# Patient Record
Sex: FEMALE | Race: WHITE | NOT HISPANIC OR LATINO | Employment: UNEMPLOYED | URBAN - METROPOLITAN AREA
[De-identification: names, ages, dates, MRNs, and addresses within clinical notes are randomized per-mention and may not be internally consistent; named-entity substitution may affect disease eponyms.]

---

## 2017-07-24 ENCOUNTER — HOSPITAL ENCOUNTER (OUTPATIENT)
Dept: FAMILY MEDICINE CLINIC | Facility: CLINIC | Age: 47
Setting detail: SPECIMEN
Discharge: HOME | End: 2017-07-24
Attending: FAMILY MEDICINE | Admitting: FAMILY MEDICINE

## 2017-07-24 LAB
BASOPHILS # BLD AUTO: 0.1 10*3/UL (ref 0–0.2)
BASOPHILS NFR BLD AUTO: 1 % (ref 0–2)
DIFFERENTIAL METHOD BLD: (no result)
EOSINOPHIL # BLD AUTO: 0 10*3/UL (ref 0–0.3)
EOSINOPHIL # BLD AUTO: 1 % (ref 0–3)
ERYTHROCYTE [DISTWIDTH] IN BLOOD BY AUTOMATED COUNT: 15.7 % (ref 11.5–14.5)
FERRITIN SERPL-MCNC: 23 NG/ML (ref 11–307)
FOLATE SERPL-MCNC: 15 NG/ML (ref 5.9–24.8)
HCT VFR BLD AUTO: 45 % (ref 35–49)
HGB BLD-MCNC: 15.3 G/DL (ref 12–15)
LYMPHOCYTES # BLD AUTO: 1 10*3/UL (ref 0.8–4.8)
LYMPHOCYTES NFR BLD AUTO: 18 % (ref 18–42)
MAGNESIUM SERPL-MCNC: 2.5 MG/DL (ref 1.8–2.5)
MCH RBC QN AUTO: 30.3 PG (ref 26–32)
MCHC RBC AUTO-ENTMCNC: 33.9 G/DL (ref 32–36)
MCV RBC AUTO: 89.4 FL (ref 80–94)
MONOCYTES # BLD AUTO: 0.4 10*3/UL (ref 0.1–1.3)
MONOCYTES NFR BLD AUTO: 8 % (ref 2–11)
NEUTROPHILS # BLD AUTO: 4 10*3/UL (ref 2.3–8.6)
NEUTROPHILS NFR BLD AUTO: 72 % (ref 50–75)
NRBC BLD AUTO-RTO: 0 /100{WBCS}
NRBC/RBC NFR BLD MANUAL: 0 10*3/UL
PLATELET # BLD AUTO: 271 10*3/UL (ref 150–450)
PMV BLD AUTO: 8.4 FL (ref 7.4–10.4)
RBC # BLD AUTO: 5.04 10*6/UL (ref 4–5.4)
T3FREE SERPL-MCNC: 3.62 PG/ML (ref 2.39–6.79)
T4 FREE SERPL-MCNC: 0.6 NG/DL (ref 0.58–1.64)
TSH SERPL-ACNC: 1.58 UIU/ML (ref 0.34–5.6)
VIT B12 SERPL-MCNC: 263 PG/ML (ref 180–914)
WBC # BLD AUTO: 5.5 10*3/UL (ref 4.5–11.5)

## 2017-07-25 ENCOUNTER — HOSPITAL ENCOUNTER (OUTPATIENT)
Dept: LAB | Facility: HOSPITAL | Age: 47
Setting detail: SPECIMEN
Discharge: HOME | End: 2017-07-25
Attending: INTERNAL MEDICINE | Admitting: INTERNAL MEDICINE

## 2017-07-25 LAB
ALBUMIN SERPL-MCNC: 4.3 G/DL (ref 3.5–4.8)
ALBUMIN/GLOB SERPL: 1.4 {RATIO} (ref 1–1.7)
ALP SERPL-CCNC: 55 IU/L (ref 32–91)
ALT SERPL-CCNC: 20 IU/L (ref 14–54)
ANION GAP SERPL CALC-SCNC: 13.6 MMOL/L (ref 10–20)
AST SERPL-CCNC: 24 IU/L (ref 15–41)
BILIRUB SERPL-MCNC: 0.5 MG/DL (ref 0.3–1.2)
BUN SERPL-MCNC: 13 MG/DL (ref 8–20)
BUN/CREAT SERPL: 13 (ref 5.4–26.2)
CALCIUM SERPL-MCNC: 9.4 MG/DL (ref 8.9–10.3)
CHLORIDE SERPL-SCNC: 104 MMOL/L (ref 101–111)
CONV CO2: 27 MMOL/L (ref 22–32)
CONV TOTAL PROTEIN: 7.4 G/DL (ref 6.1–7.9)
CORTIS SERPL-MCNC: NORMAL UG/DL
CREAT UR-MCNC: 1 MG/DL (ref 0.4–1)
GLOBULIN UR ELPH-MCNC: 3.1 G/DL (ref 2.5–3.8)
GLUCOSE SERPL-MCNC: 89 MG/DL (ref 65–99)
POTASSIUM SERPL-SCNC: 4.6 MMOL/L (ref 3.6–5.1)
SODIUM SERPL-SCNC: 140 MMOL/L (ref 136–144)
TSH SERPL-ACNC: 1.81 UIU/ML (ref 0.34–5.6)

## 2017-07-26 LAB
FSH SERPL-ACNC: NORMAL MIU/ML
LH SERPL-ACNC: NORMAL MIU/ML
PROGEST SERPL-MCNC: NORMAL NG/ML

## 2018-10-09 ENCOUNTER — HOSPITAL ENCOUNTER (OUTPATIENT)
Dept: WOUND CARE | Facility: HOSPITAL | Age: 48
Discharge: HOME | End: 2018-10-09
Attending: NURSE PRACTITIONER | Admitting: NURSE PRACTITIONER

## 2019-01-09 ENCOUNTER — HOSPITAL ENCOUNTER (OUTPATIENT)
Dept: OTHER | Facility: HOSPITAL | Age: 49
Setting detail: RECURRING SERIES
Discharge: HOME | End: 2019-05-01
Attending: OBSTETRICS & GYNECOLOGY | Admitting: OBSTETRICS & GYNECOLOGY

## 2019-03-05 ENCOUNTER — HOSPITAL ENCOUNTER (OUTPATIENT)
Facility: HOSPITAL | Age: 49
Setting detail: HOSPITAL OUTPATIENT SURGERY
End: 2019-03-05
Attending: ORTHOPAEDIC SURGERY | Admitting: ORTHOPAEDIC SURGERY

## 2019-03-06 ENCOUNTER — HOSPITAL ENCOUNTER (OUTPATIENT)
Dept: PREADMISSION TESTING | Facility: HOSPITAL | Age: 49
Discharge: HOME | End: 2019-03-06
Attending: ORTHOPAEDIC SURGERY | Admitting: ORTHOPAEDIC SURGERY

## 2019-03-06 LAB
ALBUMIN SERPL-MCNC: 4.4 G/DL (ref 3.5–4.8)
ALBUMIN/GLOB SERPL: 1.3 {RATIO} (ref 1–1.7)
ALP SERPL-CCNC: 53 IU/L (ref 32–91)
ALT SERPL-CCNC: 18 IU/L (ref 14–54)
ANION GAP SERPL CALC-SCNC: 17.9 MMOL/L (ref 10–20)
AST SERPL-CCNC: 28 IU/L (ref 15–41)
BASOPHILS # BLD AUTO: 0 10*3/UL (ref 0–0.2)
BASOPHILS NFR BLD AUTO: 1 % (ref 0–2)
BILIRUB SERPL-MCNC: 0.7 MG/DL (ref 0.3–1.2)
BUN SERPL-MCNC: 12 MG/DL (ref 8–20)
BUN/CREAT SERPL: 12 (ref 5.4–26.2)
CALCIUM SERPL-MCNC: 9.5 MG/DL (ref 8.9–10.3)
CHLORIDE SERPL-SCNC: 100 MMOL/L (ref 101–111)
CONV CO2: 24 MMOL/L (ref 22–32)
CONV TOTAL PROTEIN: 7.8 G/DL (ref 6.1–7.9)
CREAT UR-MCNC: 1 MG/DL (ref 0.4–1)
DIFFERENTIAL METHOD BLD: (no result)
EOSINOPHIL # BLD AUTO: 0 % (ref 0–3)
EOSINOPHIL # BLD AUTO: 0 10*3/UL (ref 0–0.3)
ERYTHROCYTE [DISTWIDTH] IN BLOOD BY AUTOMATED COUNT: 15.4 % (ref 11.5–14.5)
GLOBULIN UR ELPH-MCNC: 3.4 G/DL (ref 2.5–3.8)
GLUCOSE SERPL-MCNC: 96 MG/DL (ref 65–99)
HCT VFR BLD AUTO: 44.3 % (ref 35–49)
HGB BLD-MCNC: 14.9 G/DL (ref 12–15)
LYMPHOCYTES # BLD AUTO: 1.2 10*3/UL (ref 0.8–4.8)
LYMPHOCYTES NFR BLD AUTO: 17 % (ref 18–42)
MCH RBC QN AUTO: 30 PG (ref 26–32)
MCHC RBC AUTO-ENTMCNC: 33.7 G/DL (ref 32–36)
MCV RBC AUTO: 89 FL (ref 80–94)
MONOCYTES # BLD AUTO: 0.5 10*3/UL (ref 0.1–1.3)
MONOCYTES NFR BLD AUTO: 7 % (ref 2–11)
NEUTROPHILS # BLD AUTO: 5.6 10*3/UL (ref 2.3–8.6)
NEUTROPHILS NFR BLD AUTO: 75 % (ref 50–75)
NRBC BLD AUTO-RTO: 0 /100{WBCS}
NRBC/RBC NFR BLD MANUAL: 0 10*3/UL
PLATELET # BLD AUTO: 243 10*3/UL (ref 150–450)
PMV BLD AUTO: 7.8 FL (ref 7.4–10.4)
POTASSIUM SERPL-SCNC: 3.9 MMOL/L (ref 3.6–5.1)
RBC # BLD AUTO: 4.98 10*6/UL (ref 4–5.4)
SODIUM SERPL-SCNC: 138 MMOL/L (ref 136–144)
WBC # BLD AUTO: 7.4 10*3/UL (ref 4.5–11.5)

## 2019-05-02 ENCOUNTER — HOSPITAL ENCOUNTER (OUTPATIENT)
Dept: OTHER | Facility: HOSPITAL | Age: 49
Setting detail: RECURRING SERIES
Discharge: HOME | End: 2019-06-03
Attending: OBSTETRICS & GYNECOLOGY | Admitting: OBSTETRICS & GYNECOLOGY

## 2019-06-17 ENCOUNTER — TELEPHONE (OUTPATIENT)
Dept: FAMILY MEDICINE CLINIC | Facility: CLINIC | Age: 49
End: 2019-06-17

## 2019-06-18 RX ORDER — LEVOTHYROXINE AND LIOTHYRONINE 57; 13.5 UG/1; UG/1
TABLET ORAL
COMMUNITY
Start: 2017-07-24 | End: 2019-06-18

## 2019-06-18 RX ORDER — LEVOTHYROXINE, LIOTHYRONINE 57; 13.5 UG/1; UG/1
90 TABLET ORAL DAILY
Refills: 2 | COMMUNITY
Start: 2019-05-15 | End: 2020-10-15 | Stop reason: SDUPTHER

## 2019-06-18 RX ORDER — LEVOTHYROXINE, LIOTHYRONINE 9.5; 2.25 UG/1; UG/1
TABLET ORAL
Refills: 4 | COMMUNITY
Start: 2019-05-25 | End: 2020-08-25

## 2019-06-18 RX ORDER — MELATONIN 3 MG
20 LOZENGE ORAL NIGHTLY
COMMUNITY
Start: 2014-03-14

## 2019-06-18 RX ORDER — ACYCLOVIR 400 MG/1
400 TABLET ORAL
Qty: 40 TABLET | Refills: 2 | Status: SHIPPED | OUTPATIENT
Start: 2019-06-18 | End: 2019-06-22 | Stop reason: ALTCHOICE

## 2019-06-20 ENCOUNTER — TELEPHONE (OUTPATIENT)
Dept: FAMILY MEDICINE CLINIC | Facility: CLINIC | Age: 49
End: 2019-06-20

## 2019-06-20 RX ORDER — ACYCLOVIR 50 MG/G
OINTMENT TOPICAL
Qty: 5 G | Refills: 2 | Status: SHIPPED | OUTPATIENT
Start: 2019-06-20 | End: 2019-06-27

## 2019-06-21 ENCOUNTER — TELEPHONE (OUTPATIENT)
Dept: FAMILY MEDICINE CLINIC | Facility: CLINIC | Age: 49
End: 2019-06-21

## 2019-06-22 DIAGNOSIS — K21.9 GASTROESOPHAGEAL REFLUX DISEASE WITHOUT ESOPHAGITIS: Primary | ICD-10-CM

## 2019-06-22 RX ORDER — RANITIDINE 150 MG/1
150 TABLET ORAL 2 TIMES DAILY
Status: DISCONTINUED | OUTPATIENT
Start: 2019-06-22 | End: 2020-09-28

## 2019-07-16 ENCOUNTER — TELEPHONE (OUTPATIENT)
Dept: FAMILY MEDICINE CLINIC | Facility: CLINIC | Age: 49
End: 2019-07-16

## 2019-07-16 NOTE — TELEPHONE ENCOUNTER
Pharmacy request omeprazole 20mg capsule, qty 30, sig: take 1 cap po daily before breakfast, swallow whole do not crush or chew.

## 2019-07-17 RX ORDER — OMEPRAZOLE 20 MG/1
20 CAPSULE, DELAYED RELEASE ORAL DAILY
Qty: 90 CAPSULE | Refills: 1 | Status: SHIPPED | OUTPATIENT
Start: 2019-07-17 | End: 2019-07-22

## 2019-07-22 ENCOUNTER — OFFICE VISIT (OUTPATIENT)
Dept: FAMILY MEDICINE CLINIC | Facility: CLINIC | Age: 49
End: 2019-07-22

## 2019-07-22 VITALS
BODY MASS INDEX: 22.89 KG/M2 | SYSTOLIC BLOOD PRESSURE: 128 MMHG | HEART RATE: 100 BPM | WEIGHT: 124.4 LBS | HEIGHT: 62 IN | OXYGEN SATURATION: 100 % | DIASTOLIC BLOOD PRESSURE: 78 MMHG

## 2019-07-22 DIAGNOSIS — E03.9 ACQUIRED HYPOTHYROIDISM: ICD-10-CM

## 2019-07-22 DIAGNOSIS — R09.89 LABILE HYPERTENSION: Primary | ICD-10-CM

## 2019-07-22 DIAGNOSIS — E34.9 HORMONE IMBALANCE: ICD-10-CM

## 2019-07-22 DIAGNOSIS — R00.2 PALPITATIONS: ICD-10-CM

## 2019-07-22 PROBLEM — R10.2 PELVIC PAIN: Status: ACTIVE | Noted: 2019-02-21

## 2019-07-22 PROBLEM — R79.89 LOW TESTOSTERONE: Status: ACTIVE | Noted: 2017-03-23

## 2019-07-22 PROBLEM — L65.9 HAIR LOSS: Status: ACTIVE | Noted: 2017-07-24

## 2019-07-22 PROBLEM — R07.9 CHEST PAIN: Status: ACTIVE | Noted: 2019-05-14

## 2019-07-22 PROBLEM — N92.4 PREMENOPAUSAL MENORRHAGIA: Status: ACTIVE | Noted: 2017-03-23

## 2019-07-22 PROBLEM — E55.9 VITAMIN D DEFICIENCY: Status: ACTIVE | Noted: 2017-07-24

## 2019-07-22 PROBLEM — E61.2 MAGNESIUM DEFICIENCY: Status: ACTIVE | Noted: 2017-07-24

## 2019-07-22 PROCEDURE — 99213 OFFICE O/P EST LOW 20 MIN: CPT | Performed by: FAMILY MEDICINE

## 2019-07-22 RX ORDER — DEXMETHYLPHENIDATE HYDROCHLORIDE 10 MG/1
TABLET ORAL
Refills: 0 | COMMUNITY
Start: 2019-06-17 | End: 2019-08-28 | Stop reason: SDUPTHER

## 2019-07-22 RX ORDER — TRAMADOL HYDROCHLORIDE 50 MG/1
25 TABLET ORAL 2 TIMES DAILY PRN
COMMUNITY
End: 2020-01-22 | Stop reason: SDUPTHER

## 2019-07-22 NOTE — PROGRESS NOTES
"Subjective   Nessa LORY Esqueda is a 48 y.o. female.     Pt in for F/U Labile HBP and CP w recent NSCP w Neg Stress Myoview by Dr Castro.  Sees Dr Martínez normally.     /78   Pulse 100   Ht 157.5 cm (62.01\")   Wt 56.4 kg (124 lb 6.4 oz)   SpO2 100%   BMI 22.75 kg/m²     The following portions of the patient's history were reviewed and updated as appropriate: allergies, current medications, past family history, past medical history and problem list.    Review of Systems   Constitutional: Positive for fatigue.   HENT: Negative.    Eyes: Negative.    Respiratory: Negative.    Cardiovascular: Positive for palpitations.   Gastrointestinal: Negative.    Endocrine:        Hx of Hypothyroidism and Estrogen/Testosterone deficiency w supplementation.   Genitourinary: Negative.    Neurological: Negative.    Psychiatric/Behavioral: Positive for dysphoric mood. The patient is nervous/anxious.        Objective   Physical Exam   Constitutional: She is oriented to person, place, and time. She appears well-developed and well-nourished. No distress.   HENT:   Head: Normocephalic and atraumatic.   Nose: Nose normal.   Mouth/Throat: Oropharynx is clear and moist.   Eyes: Conjunctivae and EOM are normal. Pupils are equal, round, and reactive to light.   Neck: Normal range of motion. Neck supple. No thyromegaly present.   Cardiovascular: Normal rate, regular rhythm, normal heart sounds and intact distal pulses. Exam reveals no gallop.   No murmur heard.  Pulmonary/Chest: Effort normal and breath sounds normal. She has no wheezes. She has no rales.   Abdominal: Soft. Bowel sounds are normal. She exhibits no mass. There is no tenderness.   Lymphadenopathy:     She has no cervical adenopathy.   Neurological: She is alert and oriented to person, place, and time. No cranial nerve deficit or sensory deficit. She exhibits normal muscle tone.   Skin: Skin is warm and dry. Capillary refill takes 2 to 3 seconds. No rash noted. "   Psychiatric: She has a normal mood and affect. Her behavior is normal. Judgment and thought content normal.       Assessment/Plan   Nessa was seen today for heart problem.    Diagnoses and all orders for this visit:    Labile hypertension    Palpitations    Hormone imbalance    Acquired hypothyroidism

## 2019-07-22 NOTE — PATIENT INSTRUCTIONS
Recent CV W/U reviewed.  Labs and Meds reviewed w no changes at this time.  Cont Healthy Heart Diet and Exercise. F/U prn or in 6 mo for Annual Preventive Exam/Labs.

## 2019-08-28 ENCOUNTER — OFFICE VISIT (OUTPATIENT)
Dept: PSYCHIATRY | Facility: CLINIC | Age: 49
End: 2019-08-28

## 2019-08-28 VITALS — SYSTOLIC BLOOD PRESSURE: 140 MMHG | DIASTOLIC BLOOD PRESSURE: 80 MMHG

## 2019-08-28 DIAGNOSIS — F90.0 ATTENTION DEFICIT HYPERACTIVITY DISORDER (ADHD), PREDOMINANTLY INATTENTIVE TYPE: Primary | ICD-10-CM

## 2019-08-28 PROCEDURE — 99213 OFFICE O/P EST LOW 20 MIN: CPT | Performed by: PHYSICIAN ASSISTANT

## 2019-08-28 RX ORDER — DEXMETHYLPHENIDATE HYDROCHLORIDE 10 MG/1
TABLET ORAL
Qty: 90 TABLET | Refills: 0 | Status: SHIPPED | OUTPATIENT
Start: 2019-08-28 | End: 2019-10-04 | Stop reason: SDUPTHER

## 2019-08-28 NOTE — PROGRESS NOTES
"Subjective   Nessaevan Esqueda is a 48 y.o.white female who presents today for follow up    Chief Complaint:  Depression, insomnia, ADHD    History of Present Illness:   She is trying to get more help with her autistic son, therapist was   Behaviors have increased with him  Her daughter, also autistic, got kicked off the color guard team, she is fighting the administration at  for violating her IP  Still not sleeping well but does not tolerate meds or not effective  Still having marital issues  Switching to focalin was helpful    The following portions of the patient's history were reviewed and updated as appropriate: allergies, current medications, past family history, past medical history, past social history, past surgical history and problem list.    PAST PSYCHIATRIC HISTORY  Axis I  Affective/Bipoloar Disorder, Anxiety/Panic Disorder, Attention Deficit Disorder  Axis II  None    PAST OUTPATIENT TREATMENT  Diagnosis treated:  Affective Disorder, Anxiety/Panic Disorder, ADD  Treatment Type:  Medication Management, Supportive Counseling  Prior Psychiatric Medications:  Halcion did not help a lot and makes her restless in bed.  Doxepin did not help  Trazodone made her hair fall out.   Lunesta quit working.  Quetiapine   elavil did not help  Ambien was too \"hypnotic\"  Lunesta   Support Groups:  None  Sequelae Of Mental Disorder:  social isolation, family disruption, emotional distress      Interval History  No Change    Side Effects  None      Past Medical History:  Past Medical History:   Diagnosis Date   • ADHD (attention deficit hyperactivity disorder)    • Disease of thyroid gland    • Psychiatric illness    • PTSD (post-traumatic stress disorder)        Social History:  Social History     Socioeconomic History   • Marital status:      Spouse name: Not on file   • Number of children: Not on file   • Years of education: Not on file   • Highest education level: Not on file   Tobacco Use   • Smoking " status: Never Smoker   Substance and Sexual Activity   • Alcohol use: Yes     Comment: socially   • Drug use: No       Family History:  History reviewed. No pertinent family history.    Past Surgical History:  Past Surgical History:   Procedure Laterality Date   • ABDOMINAL SURGERY     • BREAST SURGERY     • HYSTERECTOMY         Problem List:  Patient Active Problem List   Diagnosis   • ADHD   • Encounter for routine gynecological examination   • Fatigue   • Hair loss   • Hormone imbalance   • Hypothyroidism   • Insomnia   • Low testosterone   • Magnesium deficiency   • Menstrual spotting   • Palpitations   • Chest pain   • Pelvic pain   • Premenopausal menorrhagia   • Vitamin D deficiency       Allergy:   Allergies   Allergen Reactions   • Lortab  [Hydrocodone-Acetaminophen] Unknown (See Comments)        Discontinued Medications:  Medications Discontinued During This Encounter   Medication Reason   • dexmethylphenidate (FOCALIN) 10 MG tablet Reorder       Current Medications:   Current Outpatient Medications   Medication Sig Dispense Refill   • dexmethylphenidate (FOCALIN) 10 MG tablet Take two in the am and one in the afternoon 90 tablet 0   • B Complex Vitamins (VITAMIN B COMPLEX PO) Take  by mouth.     • Cholecalciferol (VITAMIN D PO) Take  by mouth.     • DHEA 10 MG capsule Take  by mouth.     • MAGNESIUM PO MAGNESIUM CAPS     • Melatonin-Pyridoxine 1-10 MG tablet MELATIN TABS     • NP THYROID 15 MG tablet TAKE ONE TABLET BY MOUTH THREE TIMES A WEEK  4   • NP THYROID 90 MG tablet Take 90 mg by mouth Daily.  2   • progesterone (PROMETRIUM) 100 MG capsule Take 300 mg by mouth Daily.  6   • Specialty Vitamins Products (BIOTIN PLUS KERATIN PO) Take  by mouth.     • Testosterone Propionate (FIRST-TESTOSTERONE MC) 2 % cream Place  on the skin as directed by provider.     • traMADol (ULTRAM) 50 MG tablet Take 25 mg by mouth 2 (Two) Times a Day As Needed for Moderate Pain .     • TRAMADOL HCL PO Take 25 mg by mouth 2  (Two) Times a Day As Needed.       Current Facility-Administered Medications   Medication Dose Route Frequency Provider Last Rate Last Dose   • raNITIdine (ZANTAC) tablet 150 mg  150 mg Oral BID Milton Allen Jr., MD             Review of Symptoms:    Psychiatric/Behavioral: Negative for agitation, behavioral problems, confusion, decreased concentration, dysphoric mood, hallucinations, self-injury, sleep disturbance and suicidal ideas. The patient is not nervous/anxious and is not hyperactive.        Physical Exam:   Blood pressure 140/80.    Mental Status Exam:   Hygiene:   good  Cooperation:  Cooperative  Eye Contact:  Good  Psychomotor Behavior:  Appropriate  Affect:  Appropriate  Mood: anxious  Hopelessness: Denies  Speech:  Normal  Thought Process:  Goal directed  Thought Content:  Normal  Suicidal:  None  Homicidal:  None  Hallucinations:  None  Delusion:  None  Memory:  Intact  Orientation:  Person, Place, Time and Situation  Reliability:  good  Insight:  Good  Judgement:  Good  Impulse Control:  Good  Physical/Medical Issues:  No        PHQ-9 Depression Screening  Little interest or pleasure in doing things? 1   Feeling down, depressed, or hopeless? 2   Trouble falling or staying asleep, or sleeping too much? 2   Feeling tired or having little energy? 2   Poor appetite or overeating? 0   Feeling bad about yourself - or that you are a failure or have let yourself or your family down? 1   Trouble concentrating on things, such as reading the newspaper or watching television? 1   Moving or speaking so slowly that other people could have noticed? Or the opposite - being so fidgety or restless that you have been moving around a lot more than usual? 0   Thoughts that you would be better off dead, or of hurting yourself in some way? 0   PHQ-9 Total Score 9   If you checked off any problems, how difficult have these problems made it for you to do your work, take care of things at home, or get along with other  people? Somewhat difficult           Never smoker    I advised Nessa of the risks of tobacco use.     Lab Results:   No visits with results within 3 Month(s) from this visit.   Latest known visit with results is:   Admission on 05/14/2019, Discharged on 05/15/2019   Component Date Value Ref Range Status   • Troponin I 05/14/2019 <0.03  0.00 - 0.03 ng/mL Final    Comment: (SEE BELOW)  0.00 - 0.03    Negative. Repeat testing in 4-6 hrs if                clinically indicated.    0.04 - 0.29    Suspicious for myocardial injury. Serial                 measurements and clinical correlation may be                 necessary to confirm or exclude diagnosis of                acute coronary syndrome.                Repeat testing in 4-6 hrs if indicated.         >0.29    Consistent with myocardial injury.                Recommend clinical and laboratory correlation.    NOTE: Results may be falsely decreased if patient taking Biotin     • WBC 05/14/2019 5.1  4.5 - 11.5 10*3/uL Final   • RBC 05/14/2019 5.33  4.00 - 5.40 10*6/uL Final   • Hemoglobin 05/14/2019 16.2* 12.0 - 15.0 g/dL Final   • Hematocrit 05/14/2019 48.4  35 - 49 % Final   • MCV 05/14/2019 90.8  80 - 94 fL Final   • MCH 05/14/2019 30.4  26 - 32 pg Final   • MCHC 05/14/2019 33.4  32 - 36 g/dL Final   • RDW 05/14/2019 14.0  11.5 - 14.5 % Final   • Platelets 05/14/2019 222  150 - 450 10*3/uL Final   • MPV 05/14/2019 8.3  7.4 - 10.4 fL Final   • Differential Type 05/14/2019 AUTO   Final   • Neutrophils Absolute 05/14/2019 3.8  2.3 - 8.6 10*3/uL Final   • Lymphocytes Absolute 05/14/2019 0.6* 0.8 - 4.8 10*3/uL Final   • Monocytes Absolute 05/14/2019 0.6  0.1 - 1.3 10*3/uL Final   • Eosinophils Absolute 05/14/2019 0.1  0.0 - 0.3 10*3/uL Final   • Basophils Absolute 05/14/2019 0.0  0 - 0.2 10*3/uL Final   • Neutrophil Rel % 05/14/2019 75  50 - 75 % Final   • Lymphocyte Rel % 05/14/2019 12* 18 - 42 % Final   • Monocyte Rel % 05/14/2019 11  2 - 11 % Final   • Eosinophil  Rel % 05/14/2019 1  0 - 3 % Final   • Basophil Rel % 05/14/2019 1  0 - 2 % Final   • nRBC 05/14/2019 0  0 /100[WBCs] Final   • Absolute nRBC 05/14/2019 0  10*3/uL Final   • Protime 05/14/2019 10.4  9.6 - 11.7 sec Final   • INR 05/14/2019 1.0   Final   • PTT 05/14/2019 24.7  24.0 - 31.0 sec Final   • Sodium 05/14/2019 134* 136 - 144 mmol/L Final   • Potassium 05/14/2019 3.4* 3.6 - 5.1 mmol/L Final   • Chloride 05/14/2019 102  101 - 111 mmol/L Final   • CO2 05/14/2019 20* 22 - 32 mmol/L Final   • Glucose 05/14/2019 137* 65 - 99 mg/dL Final   • BUN 05/14/2019 12  8 - 20 mg/dL Final   • Creatinine 05/14/2019 1.2* 0.4 - 1.0 mg/dl Final   • Anion Gap 05/14/2019 15.4  10 - 20 Final   • BUN/Creatinine Ratio 05/14/2019 10.0  5.4 - 26.2 Final   • GFR MDRD Non  05/14/2019 48* >60 mL/min/1.73m2 Final   • GFR MDRD  05/14/2019 58* >60 mL/min/1.73m2 Final   • Calcium 05/14/2019 9.2  8.9 - 10.3 mg/dL Final   • Total Cholesterol 05/14/2019 182  <200 mg/dL Final   • Triglycerides 05/14/2019 84  <150 mg/dL Final   • HDL Cholesterol 05/14/2019 86  >39 mg/dL Final   • LDL Cholesterol  05/14/2019 79  0 - 100 mg/dL Final   • VLDL Cholesterol 05/14/2019 17.7  <21 mg/dL Final   • LDL/HDL Ratio 05/14/2019 0.9   Final   • Chol/HDL Ratio 05/14/2019 2.1   Final   • Lipid Interpretation 05/14/2019 (SEE BELOW)   Final    Comment:   The following guidelines have been recommended by the NCEP for Total  Cholesterol, Total Triglycerides, LDL Cholesterol,and HDL Cholesterol:    TOTAL CHOLESTEROL                    HDL CHOLESTEROL   Desirable:            <200 mg/dL     Low HDL:            <40 mg/dL   Borderline High:   200-239 mg/dL     Normal:           40-60 mg/dL   High:           > or = 240 mg/dL     Desirable:          >60 mg/dL    TOTAL TRIGLYCERIDE                   LDL CHOLESTEROL   Normal:               <150 mg/dL     Optimal:           <100 mg/dL   Borderline High:   150-199 mg/dL     Low Risk:       100-129  mg/dL   High:              200-499 mg/dL     Borderline High:130-159 mg/dL   Very High:      > or = 500 mg/dL     High:           160-189 mg/dL                                        Very High:   > or = 190 mg/dL    The following ratios of LDL to HDL and Total Cholesterol to HDL are  for information only:    LDL/HDL RATIO                        TOTAL CHOLESTEROL/HDL RATIO   Desirable:              <5                                      Low Risk:       3.3-4.4    Optimal:        < or = 3.5           Average Risk:   4.4-7.1                                         Medium Risk:    7.1-11                                          High Risk:         >11         • Troponin I 05/14/2019 <0.03  0.00 - 0.03 ng/mL Final    Comment: (SEE BELOW)  0.00 - 0.03    Negative. Repeat testing in 4-6 hrs if                clinically indicated.    0.04 - 0.29    Suspicious for myocardial injury. Serial                 measurements and clinical correlation may be                 necessary to confirm or exclude diagnosis of                acute coronary syndrome.                Repeat testing in 4-6 hrs if indicated.         >0.29    Consistent with myocardial injury.                Recommend clinical and laboratory correlation.    NOTE: Results may be falsely decreased if patient taking Biotin     • Troponin I 05/15/2019 <0.03  0.00 - 0.03 ng/mL Final    Comment: (SEE BELOW)  0.00 - 0.03    Negative. Repeat testing in 4-6 hrs if                clinically indicated.    0.04 - 0.29    Suspicious for myocardial injury. Serial                 measurements and clinical correlation may be                 necessary to confirm or exclude diagnosis of                acute coronary syndrome.                Repeat testing in 4-6 hrs if indicated.         >0.29    Consistent with myocardial injury.                Recommend clinical and laboratory correlation.    NOTE: Results may be falsely decreased if patient taking Biotin     • Troponin I  05/15/2019 <0.03  0.00 - 0.03 ng/mL Final    Comment: (SEE BELOW)  0.00 - 0.03    Negative. Repeat testing in 4-6 hrs if                clinically indicated.    0.04 - 0.29    Suspicious for myocardial injury. Serial                 measurements and clinical correlation may be                 necessary to confirm or exclude diagnosis of                acute coronary syndrome.                Repeat testing in 4-6 hrs if indicated.         >0.29    Consistent with myocardial injury.                Recommend clinical and laboratory correlation.    NOTE: Results may be falsely decreased if patient taking Biotin         Assessment/Plan   Problems Addressed this Visit        Other    ADHD - Primary    Relevant Medications    dexmethylphenidate (FOCALIN) 10 MG tablet          Visit Diagnoses:    ICD-10-CM ICD-9-CM   1. Attention deficit hyperactivity disorder (ADHD), predominantly inattentive type F90.0 314.00       TREATMENT PLAN/GOALS: Continue supportive psychotherapy efforts and medications as indicated. Treatment and medication options discussed during today's visit. Patient ackowledged and verbally consented to continue with current treatment plan and was educated on the importance of compliance with treatment and follow-up appointments.    MEDICATION ISSUES:  INSPECT reviewed as expected  Discussed medication options and treatment plan of prescribed medication as well as the risks, benefits, and side effects including potential falls, possible impaired driving and metabolic adversities among others. Patient is agreeable to call the office with any worsening of symptoms or onset of side effects. Patient is agreeable to call 911 or go to the nearest ER should he/she begin having SI/HI. No medication side effects or related complaints today.     Patient is doing well on Focalin, no changes needed.  She is due for refill today per inspect.  Urine drug screen at next visit.    MEDS ORDERED DURING VISIT:  New Medications  Ordered This Visit   Medications   • dexmethylphenidate (FOCALIN) 10 MG tablet     Sig: Take two in the am and one in the afternoon     Dispense:  90 tablet     Refill:  0       Return in about 3 months (around 11/28/2019).         This document has been electronically signed by Raina Jordan PA-C  August 28, 2019 10:47 AM

## 2019-09-30 RX ORDER — PREGABALIN 75 MG/1
CAPSULE ORAL
Qty: 60 CAPSULE | Refills: 0 | Status: SHIPPED | OUTPATIENT
Start: 2019-09-30 | End: 2019-12-09

## 2019-10-04 DIAGNOSIS — F90.0 ATTENTION DEFICIT HYPERACTIVITY DISORDER (ADHD), PREDOMINANTLY INATTENTIVE TYPE: ICD-10-CM

## 2019-10-08 NOTE — TELEPHONE ENCOUNTER
Last  7/22/19  Future  none    Labs  5/15/19    Sees doctor for knee pain and stomach issues (post hysterectomy).     Patient also needs Lamictal 75 mg capsules bid.  It is not in her medication list.

## 2019-10-09 RX ORDER — TRAMADOL HYDROCHLORIDE 50 MG/1
25 TABLET ORAL 2 TIMES DAILY PRN
OUTPATIENT
Start: 2019-10-09

## 2019-10-09 RX ORDER — DEXMETHYLPHENIDATE HYDROCHLORIDE 10 MG/1
TABLET ORAL
Qty: 90 TABLET | Refills: 0 | Status: SHIPPED | OUTPATIENT
Start: 2019-10-09 | End: 2019-12-04 | Stop reason: ALTCHOICE

## 2019-10-28 RX ORDER — PREGABALIN 75 MG/1
CAPSULE ORAL
Qty: 60 CAPSULE | Refills: 0 | OUTPATIENT
Start: 2019-10-28

## 2019-12-04 ENCOUNTER — OFFICE VISIT (OUTPATIENT)
Dept: PSYCHIATRY | Facility: CLINIC | Age: 49
End: 2019-12-04

## 2019-12-04 VITALS — DIASTOLIC BLOOD PRESSURE: 84 MMHG | SYSTOLIC BLOOD PRESSURE: 140 MMHG

## 2019-12-04 DIAGNOSIS — F90.0 ATTENTION DEFICIT HYPERACTIVITY DISORDER (ADHD), PREDOMINANTLY INATTENTIVE TYPE: Primary | ICD-10-CM

## 2019-12-04 PROCEDURE — 99213 OFFICE O/P EST LOW 20 MIN: CPT | Performed by: PHYSICIAN ASSISTANT

## 2019-12-04 RX ORDER — METHYLPHENIDATE HYDROCHLORIDE 10 MG/1
TABLET ORAL
Qty: 90 TABLET | Refills: 0 | Status: SHIPPED | OUTPATIENT
Start: 2019-12-04 | End: 2020-01-07 | Stop reason: SDUPTHER

## 2019-12-04 NOTE — PROGRESS NOTES
"Subjective   Nessaevan Esqueda is a 49 y.o. white female who presents today for follow up    Chief Complaint:  Depression, insomnia, ADHD     History of Present Illness:   Still working on getting extra help for her ASD son (10 yrs old)  Daughter with ASD also having more behaviors  No support from   She is still going to counseling regularly,   Anxiety 8/10 due to her children's issues, lack of support  Chronic insomnia  She would like to switch back to Ritalin to see if it works better than the Focalin, it upsets her stomach a bit.   Denies Si/HI    The following portions of the patient's history were reviewed and updated as appropriate: allergies, current medications, past family history, past medical history, past social history, past surgical history and problem list.    PAST PSYCHIATRIC HISTORY  Axis I  Affective/Bipoloar Disorder, Anxiety/Panic Disorder, Attention Deficit Disorder  Axis II  None    PAST OUTPATIENT TREATMENT  Diagnosis treated:  Affective Disorder, Anxiety/Panic Disorder, ADD  Treatment Type:  Medication Management, Supportive Counseling  Prior Psychiatric Medications:  Halcion did not help a lot and makes her restless in bed.  Doxepin did not help  Trazodone made her hair fall out.   Lunesta quit working.  Quetiapine   elavil did not help  Ambien was too \"hypnotic\"  Lunesta   Support Groups:  None  Sequelae Of Mental Disorder:  social isolation, family disruption, emotional distress      Interval History  No Change    Side Effects  None      Past Medical History:  Past Medical History:   Diagnosis Date   • ADHD (attention deficit hyperactivity disorder)    • Disease of thyroid gland    • Psychiatric illness    • PTSD (post-traumatic stress disorder)        Social History:  Social History     Socioeconomic History   • Marital status:      Spouse name: Not on file   • Number of children: Not on file   • Years of education: Not on file   • Highest education level: Not on file "   Tobacco Use   • Smoking status: Never Smoker   Substance and Sexual Activity   • Alcohol use: Yes     Comment: socially   • Drug use: No       Family History:  History reviewed. No pertinent family history.    Past Surgical History:  Past Surgical History:   Procedure Laterality Date   • ABDOMINAL SURGERY     • BREAST SURGERY     • HYSTERECTOMY         Problem List:  Patient Active Problem List   Diagnosis   • ADHD   • Encounter for routine gynecological examination   • Fatigue   • Hair loss   • Hormone imbalance   • Hypothyroidism   • Insomnia   • Low testosterone   • Magnesium deficiency   • Menstrual spotting   • Palpitations   • Chest pain   • Pelvic pain   • Premenopausal menorrhagia   • Vitamin D deficiency       Allergy:   Allergies   Allergen Reactions   • Lortab  [Hydrocodone-Acetaminophen] Unknown (See Comments)        Discontinued Medications:  Medications Discontinued During This Encounter   Medication Reason   • dexmethylphenidate (FOCALIN) 10 MG tablet Alternate therapy       Current Medications:   Current Outpatient Medications   Medication Sig Dispense Refill   • B Complex Vitamins (VITAMIN B COMPLEX PO) Take  by mouth.     • Cholecalciferol (VITAMIN D PO) Take  by mouth.     • DHEA 10 MG capsule Take  by mouth.     • MAGNESIUM PO MAGNESIUM CAPS     • Melatonin-Pyridoxine 1-10 MG tablet MELATIN TABS     • methylphenidate (RITALIN) 10 MG tablet Take two in the AM and one in the afternoon 90 tablet 0   • NP THYROID 15 MG tablet TAKE ONE TABLET BY MOUTH THREE TIMES A WEEK  4   • NP THYROID 90 MG tablet Take 90 mg by mouth Daily.  2   • pregabalin (LYRICA) 75 MG capsule TAKE ONE CAPSULE BY MOUTH TWICE A DAY 60 capsule 0   • progesterone (PROMETRIUM) 100 MG capsule Take 300 mg by mouth Daily.  6   • Specialty Vitamins Products (BIOTIN PLUS KERATIN PO) Take  by mouth.     • Testosterone Propionate (FIRST-TESTOSTERONE MC) 2 % cream Place  on the skin as directed by provider.     • traMADol (ULTRAM) 50 MG  tablet Take 25 mg by mouth 2 (Two) Times a Day As Needed for Moderate Pain .       Current Facility-Administered Medications   Medication Dose Route Frequency Provider Last Rate Last Dose   • raNITIdine (ZANTAC) tablet 150 mg  150 mg Oral BID Milton Allen Jr., MD             Review of Symptoms:    Psychiatric/Behavioral: Negative for agitation, behavioral problems, confusion, decreased concentration, dysphoric mood, hallucinations, self-injury, sleep disturbance and suicidal ideas. The patient is mildly nervous/anxious and is not hyperactive.        Physical Exam:   Blood pressure 140/84.    Mental Status Exam:   Hygiene:   good  Cooperation:  Cooperative  Eye Contact:  Good  Psychomotor Behavior:  Appropriate  Affect:  Appropriate  Mood: anxious  Hopelessness: Denies  Speech:  Normal  Thought Process:  Goal directed  Thought Content:  Normal  Suicidal:  None  Homicidal:  None  Hallucinations:  None  Delusion:  None  Memory:  Intact  Orientation:  Person, Place, Time and Situation  Reliability:  good  Insight:  Good  Judgement:  Good  Impulse Control:  Good  Physical/Medical Issues:  No        PHQ-9 Depression Screening  Little interest or pleasure in doing things? 1   Feeling down, depressed, or hopeless? 1   Trouble falling or staying asleep, or sleeping too much? 3   Feeling tired or having little energy? 1   Poor appetite or overeating? 0   Feeling bad about yourself - or that you are a failure or have let yourself or your family down? 1   Trouble concentrating on things, such as reading the newspaper or watching television? 1   Moving or speaking so slowly that other people could have noticed? Or the opposite - being so fidgety or restless that you have been moving around a lot more than usual? 0   Thoughts that you would be better off dead, or of hurting yourself in some way? 0   PHQ-9 Total Score 8   If you checked off any problems, how difficult have these problems made it for you to do your work,  take care of things at home, or get along with other people? Somewhat difficult           Never smoker    I advised Nessa of the risks of tobacco use.     Lab Results:   No visits with results within 3 Month(s) from this visit.   Latest known visit with results is:   Admission on 05/14/2019, Discharged on 05/15/2019   Component Date Value Ref Range Status   • Troponin I 05/14/2019 <0.03  0.00 - 0.03 ng/mL Final    Comment: (SEE BELOW)  0.00 - 0.03    Negative. Repeat testing in 4-6 hrs if                clinically indicated.    0.04 - 0.29    Suspicious for myocardial injury. Serial                 measurements and clinical correlation may be                 necessary to confirm or exclude diagnosis of                acute coronary syndrome.                Repeat testing in 4-6 hrs if indicated.         >0.29    Consistent with myocardial injury.                Recommend clinical and laboratory correlation.    NOTE: Results may be falsely decreased if patient taking Biotin     • WBC 05/14/2019 5.1  4.5 - 11.5 10*3/uL Final   • RBC 05/14/2019 5.33  4.00 - 5.40 10*6/uL Final   • Hemoglobin 05/14/2019 16.2* 12.0 - 15.0 g/dL Final   • Hematocrit 05/14/2019 48.4  35 - 49 % Final   • MCV 05/14/2019 90.8  80 - 94 fL Final   • MCH 05/14/2019 30.4  26 - 32 pg Final   • MCHC 05/14/2019 33.4  32 - 36 g/dL Final   • RDW 05/14/2019 14.0  11.5 - 14.5 % Final   • Platelets 05/14/2019 222  150 - 450 10*3/uL Final   • MPV 05/14/2019 8.3  7.4 - 10.4 fL Final   • Differential Type 05/14/2019 AUTO   Final   • Neutrophils Absolute 05/14/2019 3.8  2.3 - 8.6 10*3/uL Final   • Lymphocytes Absolute 05/14/2019 0.6* 0.8 - 4.8 10*3/uL Final   • Monocytes Absolute 05/14/2019 0.6  0.1 - 1.3 10*3/uL Final   • Eosinophils Absolute 05/14/2019 0.1  0.0 - 0.3 10*3/uL Final   • Basophils Absolute 05/14/2019 0.0  0 - 0.2 10*3/uL Final   • Neutrophil Rel % 05/14/2019 75  50 - 75 % Final   • Lymphocyte Rel % 05/14/2019 12* 18 - 42 % Final   •  Monocyte Rel % 05/14/2019 11  2 - 11 % Final   • Eosinophil Rel % 05/14/2019 1  0 - 3 % Final   • Basophil Rel % 05/14/2019 1  0 - 2 % Final   • nRBC 05/14/2019 0  0 /100[WBCs] Final   • Absolute nRBC 05/14/2019 0  10*3/uL Final   • Protime 05/14/2019 10.4  9.6 - 11.7 sec Final   • INR 05/14/2019 1.0   Final   • PTT 05/14/2019 24.7  24.0 - 31.0 sec Final   • Sodium 05/14/2019 134* 136 - 144 mmol/L Final   • Potassium 05/14/2019 3.4* 3.6 - 5.1 mmol/L Final   • Chloride 05/14/2019 102  101 - 111 mmol/L Final   • CO2 05/14/2019 20* 22 - 32 mmol/L Final   • Glucose 05/14/2019 137* 65 - 99 mg/dL Final   • BUN 05/14/2019 12  8 - 20 mg/dL Final   • Creatinine 05/14/2019 1.2* 0.4 - 1.0 mg/dl Final   • Anion Gap 05/14/2019 15.4  10 - 20 Final   • BUN/Creatinine Ratio 05/14/2019 10.0  5.4 - 26.2 Final   • GFR MDRD Non  05/14/2019 48* >60 mL/min/1.73m2 Final   • GFR MDRD  05/14/2019 58* >60 mL/min/1.73m2 Final   • Calcium 05/14/2019 9.2  8.9 - 10.3 mg/dL Final   • Total Cholesterol 05/14/2019 182  <200 mg/dL Final   • Triglycerides 05/14/2019 84  <150 mg/dL Final   • HDL Cholesterol 05/14/2019 86  >39 mg/dL Final   • LDL Cholesterol  05/14/2019 79  0 - 100 mg/dL Final   • VLDL Cholesterol 05/14/2019 17.7  <21 mg/dL Final   • LDL/HDL Ratio 05/14/2019 0.9   Final   • Chol/HDL Ratio 05/14/2019 2.1   Final   • Lipid Interpretation 05/14/2019 (SEE BELOW)   Final    Comment:   The following guidelines have been recommended by the NCEP for Total  Cholesterol, Total Triglycerides, LDL Cholesterol,and HDL Cholesterol:    TOTAL CHOLESTEROL                    HDL CHOLESTEROL   Desirable:            <200 mg/dL     Low HDL:            <40 mg/dL   Borderline High:   200-239 mg/dL     Normal:           40-60 mg/dL   High:           > or = 240 mg/dL     Desirable:          >60 mg/dL    TOTAL TRIGLYCERIDE                   LDL CHOLESTEROL   Normal:               <150 mg/dL     Optimal:           <100 mg/dL    Borderline High:   150-199 mg/dL     Low Risk:       100-129 mg/dL   High:              200-499 mg/dL     Borderline High:130-159 mg/dL   Very High:      > or = 500 mg/dL     High:           160-189 mg/dL                                        Very High:   > or = 190 mg/dL    The following ratios of LDL to HDL and Total Cholesterol to HDL are  for information only:    LDL/HDL RATIO                        TOTAL CHOLESTEROL/HDL RATIO   Desirable:              <5                                      Low Risk:       3.3-4.4    Optimal:        < or = 3.5           Average Risk:   4.4-7.1                                         Medium Risk:    7.1-11                                          High Risk:         >11         • Troponin I 05/14/2019 <0.03  0.00 - 0.03 ng/mL Final    Comment: (SEE BELOW)  0.00 - 0.03    Negative. Repeat testing in 4-6 hrs if                clinically indicated.    0.04 - 0.29    Suspicious for myocardial injury. Serial                 measurements and clinical correlation may be                 necessary to confirm or exclude diagnosis of                acute coronary syndrome.                Repeat testing in 4-6 hrs if indicated.         >0.29    Consistent with myocardial injury.                Recommend clinical and laboratory correlation.    NOTE: Results may be falsely decreased if patient taking Biotin     • Troponin I 05/15/2019 <0.03  0.00 - 0.03 ng/mL Final    Comment: (SEE BELOW)  0.00 - 0.03    Negative. Repeat testing in 4-6 hrs if                clinically indicated.    0.04 - 0.29    Suspicious for myocardial injury. Serial                 measurements and clinical correlation may be                 necessary to confirm or exclude diagnosis of                acute coronary syndrome.                Repeat testing in 4-6 hrs if indicated.         >0.29    Consistent with myocardial injury.                Recommend clinical and laboratory correlation.    NOTE: Results may be falsely  decreased if patient taking Biotin     • Troponin I 05/15/2019 <0.03  0.00 - 0.03 ng/mL Final    Comment: (SEE BELOW)  0.00 - 0.03    Negative. Repeat testing in 4-6 hrs if                clinically indicated.    0.04 - 0.29    Suspicious for myocardial injury. Serial                 measurements and clinical correlation may be                 necessary to confirm or exclude diagnosis of                acute coronary syndrome.                Repeat testing in 4-6 hrs if indicated.         >0.29    Consistent with myocardial injury.                Recommend clinical and laboratory correlation.    NOTE: Results may be falsely decreased if patient taking Biotin         Assessment/Plan   Problems Addressed this Visit        Other    ADHD - Primary    Relevant Medications    methylphenidate (RITALIN) 10 MG tablet          Visit Diagnoses:    ICD-10-CM ICD-9-CM   1. Attention deficit hyperactivity disorder (ADHD), predominantly inattentive type F90.0 314.00       TREATMENT PLAN/GOALS: Continue supportive psychotherapy efforts and medications as indicated. Treatment and medication options discussed during today's visit. Patient ackowledged and verbally consented to continue with current treatment plan and was educated on the importance of compliance with treatment and follow-up appointments.    MEDICATION ISSUES:  INSPECT reviewed as expected  Discussed medication options and treatment plan of prescribed medication as well as the risks, benefits, and side effects including potential falls, possible impaired driving and metabolic adversities among others. Patient is agreeable to call the office with any worsening of symptoms or onset of side effects. Patient is agreeable to call 911 or go to the nearest ER should he/she begin having SI/HI. No medication side effects or related complaints today.     Patient is still struggling with a lot of stressors in her life, particularly her kids and lack of support from anyone else in  the family or community.  She would like to try going back to Ritalin to see if it works better than Focalin.  Ritalin 10 mg 2 tablets in the morning and 1 tablet in the afternoon as needed.  #90, printed prescription.  She has an appointment with Dr. Christian to establish care and follow-up on her blood pressure next week.    MEDS ORDERED DURING VISIT:  New Medications Ordered This Visit   Medications   • methylphenidate (RITALIN) 10 MG tablet     Sig: Take two in the AM and one in the afternoon     Dispense:  90 tablet     Refill:  0       Return in about 3 months (around 3/4/2020).         This document has been electronically signed by Raina Jordan PA-C  December 4, 2019 10:54 AM

## 2019-12-09 ENCOUNTER — OFFICE VISIT (OUTPATIENT)
Dept: FAMILY MEDICINE CLINIC | Facility: CLINIC | Age: 49
End: 2019-12-09

## 2019-12-09 ENCOUNTER — RESULTS ENCOUNTER (OUTPATIENT)
Dept: FAMILY MEDICINE CLINIC | Facility: CLINIC | Age: 49
End: 2019-12-09

## 2019-12-09 VITALS
HEIGHT: 62 IN | DIASTOLIC BLOOD PRESSURE: 72 MMHG | TEMPERATURE: 98.4 F | HEART RATE: 76 BPM | SYSTOLIC BLOOD PRESSURE: 130 MMHG | RESPIRATION RATE: 16 BRPM | BODY MASS INDEX: 23.74 KG/M2 | WEIGHT: 129 LBS

## 2019-12-09 DIAGNOSIS — Z00.01 ANNUAL VISIT FOR GENERAL ADULT MEDICAL EXAMINATION WITH ABNORMAL FINDINGS: ICD-10-CM

## 2019-12-09 DIAGNOSIS — Z12.11 SCREENING FOR COLON CANCER: Primary | ICD-10-CM

## 2019-12-09 DIAGNOSIS — E55.9 VITAMIN D DEFICIENCY: ICD-10-CM

## 2019-12-09 DIAGNOSIS — E34.9 HORMONE IMBALANCE: ICD-10-CM

## 2019-12-09 DIAGNOSIS — E03.9 ACQUIRED HYPOTHYROIDISM: ICD-10-CM

## 2019-12-09 DIAGNOSIS — G89.29 OTHER CHRONIC PAIN: ICD-10-CM

## 2019-12-09 DIAGNOSIS — F41.9 ANXIETY: ICD-10-CM

## 2019-12-09 DIAGNOSIS — Z12.11 SCREENING FOR COLON CANCER: ICD-10-CM

## 2019-12-09 DIAGNOSIS — G47.00 INSOMNIA, UNSPECIFIED TYPE: ICD-10-CM

## 2019-12-09 LAB
25(OH)D3 SERPL-MCNC: 143 NG/ML (ref 30–100)
ALBUMIN SERPL-MCNC: 4.6 G/DL (ref 3.5–5.2)
ALBUMIN/GLOB SERPL: 1.3 G/DL
ALP SERPL-CCNC: 57 U/L (ref 39–117)
ALT SERPL W P-5'-P-CCNC: 17 U/L (ref 1–33)
ANION GAP SERPL CALCULATED.3IONS-SCNC: 13.7 MMOL/L (ref 5–15)
AST SERPL-CCNC: 29 U/L (ref 1–32)
BASOPHILS # BLD AUTO: 0.06 10*3/MM3 (ref 0–0.2)
BASOPHILS NFR BLD AUTO: 1 % (ref 0–1.5)
BILIRUB SERPL-MCNC: 0.3 MG/DL (ref 0.2–1.2)
BUN BLD-MCNC: 14 MG/DL (ref 6–20)
BUN/CREAT SERPL: 15.4 (ref 7–25)
CALCIUM SPEC-SCNC: 9.7 MG/DL (ref 8.6–10.5)
CHLORIDE SERPL-SCNC: 99 MMOL/L (ref 98–107)
CHOLEST SERPL-MCNC: 206 MG/DL (ref 0–200)
CO2 SERPL-SCNC: 26.3 MMOL/L (ref 22–29)
CORTIS SERPL-MCNC: 9.37 MCG/DL
CREAT BLD-MCNC: 0.91 MG/DL (ref 0.57–1)
DEPRECATED RDW RBC AUTO: 41.7 FL (ref 37–54)
EOSINOPHIL # BLD AUTO: 0.06 10*3/MM3 (ref 0–0.4)
EOSINOPHIL NFR BLD AUTO: 1 % (ref 0.3–6.2)
ERYTHROCYTE [DISTWIDTH] IN BLOOD BY AUTOMATED COUNT: 13 % (ref 12.3–15.4)
GFR SERPL CREATININE-BSD FRML MDRD: 65 ML/MIN/1.73
GLOBULIN UR ELPH-MCNC: 3.6 GM/DL
GLUCOSE BLD-MCNC: 98 MG/DL (ref 65–99)
HBA1C MFR BLD: 4.7 % (ref 3.5–5.6)
HCT VFR BLD AUTO: 44.2 % (ref 34–46.6)
HDLC SERPL-MCNC: 105 MG/DL (ref 40–60)
HGB BLD-MCNC: 15 G/DL (ref 12–15.9)
IMM GRANULOCYTES # BLD AUTO: 0.02 10*3/MM3 (ref 0–0.05)
IMM GRANULOCYTES NFR BLD AUTO: 0.3 % (ref 0–0.5)
LDLC SERPL CALC-MCNC: 91 MG/DL (ref 0–100)
LDLC/HDLC SERPL: 0.87 {RATIO}
LYMPHOCYTES # BLD AUTO: 1.07 10*3/MM3 (ref 0.7–3.1)
LYMPHOCYTES NFR BLD AUTO: 17.3 % (ref 19.6–45.3)
MCH RBC QN AUTO: 29.9 PG (ref 26.6–33)
MCHC RBC AUTO-ENTMCNC: 33.9 G/DL (ref 31.5–35.7)
MCV RBC AUTO: 88 FL (ref 79–97)
MONOCYTES # BLD AUTO: 0.54 10*3/MM3 (ref 0.1–0.9)
MONOCYTES NFR BLD AUTO: 8.7 % (ref 5–12)
NEUTROPHILS # BLD AUTO: 4.44 10*3/MM3 (ref 1.7–7)
NEUTROPHILS NFR BLD AUTO: 71.7 % (ref 42.7–76)
NRBC BLD AUTO-RTO: 0 /100 WBC (ref 0–0.2)
PLATELET # BLD AUTO: 271 10*3/MM3 (ref 140–450)
PMV BLD AUTO: 10.3 FL (ref 6–12)
POTASSIUM BLD-SCNC: 4.4 MMOL/L (ref 3.5–5.2)
PROT SERPL-MCNC: 8.2 G/DL (ref 6–8.5)
RBC # BLD AUTO: 5.02 10*6/MM3 (ref 3.77–5.28)
SODIUM BLD-SCNC: 139 MMOL/L (ref 136–145)
T4 FREE SERPL-MCNC: 0.73 NG/DL (ref 0.93–1.7)
TESTOST SERPL-MCNC: 534 NG/DL (ref 2.9–40.8)
TRIGL SERPL-MCNC: 49 MG/DL (ref 0–150)
TSH SERPL DL<=0.05 MIU/L-ACNC: 2.86 UIU/ML (ref 0.27–4.2)
VLDLC SERPL-MCNC: 9.8 MG/DL (ref 5–40)
WBC NRBC COR # BLD: 6.19 10*3/MM3 (ref 3.4–10.8)

## 2019-12-09 PROCEDURE — 80053 COMPREHEN METABOLIC PANEL: CPT | Performed by: FAMILY MEDICINE

## 2019-12-09 PROCEDURE — 84443 ASSAY THYROID STIM HORMONE: CPT | Performed by: FAMILY MEDICINE

## 2019-12-09 PROCEDURE — 84439 ASSAY OF FREE THYROXINE: CPT | Performed by: FAMILY MEDICINE

## 2019-12-09 PROCEDURE — 99213 OFFICE O/P EST LOW 20 MIN: CPT | Performed by: FAMILY MEDICINE

## 2019-12-09 PROCEDURE — 83036 HEMOGLOBIN GLYCOSYLATED A1C: CPT | Performed by: FAMILY MEDICINE

## 2019-12-09 PROCEDURE — 82306 VITAMIN D 25 HYDROXY: CPT | Performed by: FAMILY MEDICINE

## 2019-12-09 PROCEDURE — 84403 ASSAY OF TOTAL TESTOSTERONE: CPT | Performed by: FAMILY MEDICINE

## 2019-12-09 PROCEDURE — 82533 TOTAL CORTISOL: CPT | Performed by: FAMILY MEDICINE

## 2019-12-09 PROCEDURE — 80061 LIPID PANEL: CPT | Performed by: FAMILY MEDICINE

## 2019-12-09 PROCEDURE — 99386 PREV VISIT NEW AGE 40-64: CPT | Performed by: FAMILY MEDICINE

## 2019-12-09 PROCEDURE — 85025 COMPLETE CBC W/AUTO DIFF WBC: CPT | Performed by: FAMILY MEDICINE

## 2019-12-09 RX ORDER — GABAPENTIN 300 MG/1
300 CAPSULE ORAL 2 TIMES DAILY
COMMUNITY
End: 2019-12-09 | Stop reason: SDUPTHER

## 2019-12-09 RX ORDER — GABAPENTIN 300 MG/1
CAPSULE ORAL
Qty: 90 CAPSULE | Refills: 5 | Status: SHIPPED | OUTPATIENT
Start: 2019-12-09 | End: 2020-06-04

## 2019-12-09 RX ORDER — OMEPRAZOLE 20 MG/1
20 CAPSULE, DELAYED RELEASE ORAL DAILY
COMMUNITY
End: 2019-12-09

## 2019-12-09 RX ORDER — DULOXETIN HYDROCHLORIDE 30 MG/1
30 CAPSULE, DELAYED RELEASE ORAL DAILY
Qty: 90 CAPSULE | Refills: 1 | Status: SHIPPED | OUTPATIENT
Start: 2019-12-09 | End: 2020-08-25

## 2019-12-09 NOTE — PROGRESS NOTES
"Chief Complaint   Patient presents with   • Stress     New pt   • Hypertension     HPI  Nessa Esqueda is a 49 y.o. female that presents to establish care and discuss hypertension.    **History limited by conflicting information/report over course of history taking    HTN: /72 today. She reports it is often 140s/70s at her therapist's    Psych: seeing behavior NP (Raina Jordan, practices w/ psych) She prescribes Ritalin for ADHD. Has 2 autistic children- very demanding.   Reports PTSD therapist as well. PTSD reportedly 2/2 numerous family deaths (both parents) and stressful home life. 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    Endo: follows w/ Julieta Bassett NP (practices w/ Dr Santamaria) for hormonal imbalance. She prescribes thyroid medication and testosterone supplementation. Dr Santamaria writes for progresterone. She gets DHEA and vitamins OTC. These providers [facility] in [place]. [facility] is on [address]     Chronic pain: S/p abdominal hysterectomy c/b fluid collection. She continues to have chronic pain     L knee pain: hx torn meniscus. Followed by Dr Rodriguez but referred to [facility]. No surgery has been performed. Reports bilateral tear. Had steroid injection that only lasted a couple days. Wearing knee brace, taking gabapentin and tramadol.     Insomnia: taking doxylamine OTC. Attributes to stress in her life. Doesn't want antidepressant. Has tried trazodone but made her hair fall out.     GERD: episodes of GERD in 5/2019. Ended of w/ nuclear exam that was normal. Diagnosed w/ GERD and given omeprazole. No longer requiring    Review of Systems   Constitutional: Negative for chills, fever and unexpected weight loss.   HENT: Positive for congestion. Negative for rhinorrhea and sore throat.    Eyes: Negative for visual disturbance.   Respiratory: Negative for cough and shortness of breath.    Cardiovascular: Positive for chest pain and palpitations.   Gastrointestinal: Positive for abdominal " "pain and constipation. Negative for diarrhea, nausea and vomiting.   Genitourinary: Negative for dysuria.   Musculoskeletal: Positive for arthralgias. Negative for joint swelling.   Skin: Negative for rash and skin lesions.   Neurological: Negative for weakness and headache.   Psychiatric/Behavioral: Positive for depressed mood. The patient is nervous/anxious.      The following portions of the patient's history were reviewed and updated as appropriate: problem list, past medical history, past surgical history, allergies, current medications, past social history and past family history.    Problem List Tab  Patient History Tab  Immunizations Tab  Medications Tab  Chart Review Tab  Care Everywhere Tab  Synopsis Tab    PE  Vitals:    12/09/19 0933   BP: 130/72   Pulse: 76   Resp: 16   Temp: 98.4 °F (36.9 °C)     Body mass index is 23.59 kg/m².  General: Well nourished, NAD  Head: AT/NC  Eyes: EOMI, anicteric sclera  ENT: MMM w/o erythema  Neck: Supple, no thyromegaly or LAD  Resp: CTAB, SCR, BS equal  CV: RRR w/o m/r/g; 2+ pulses  GI: Soft, ND, +BS. Mild suprapubic/lower abdominal pain w/ palpation.  MSK: FROM, no deformity, no edema  Skin: Warm, dry, intact  Neuro: Alert and oriented. No focal deficits  Psych: Appropriate mood and affect    Imaging  No Images in the past 120 days found..    Assessment/Plan   Nessa LORY Esqueda is a 49 y.o. female that presents for   Chief Complaint   Patient presents with   • Stress     New pt   • Hypertension     Nessa was seen today for stress and hypertension.    Diagnoses and all orders for this visit:    Annual visit for general adult medical examination with abnormal findings  -     Cologuard - Stool, Per Rectum; Future  -     CBC & Differential  -     Comprehensive Metabolic Panel  -     Hemoglobin A1c  -     Lipid Panel  -     CBC Auto Differential    Anxiety: Patient reports significant anxiety and \"PTSD\" due to death of her parents 1 to 2 years ago and chaos at home.  " She reports that 2 of her 6 children have been diagnosed with autism spectrum disorder, another to have been diagnosed with ADHD, and she personally feels that her  and another child are likely on the spectrum.  She follows with Raina Jordan with behavioral health who writes Ritalin for her own ADHD.  She refuses to take antidepressant medications.   - Cont close follow-up w/ both Raina Jordan and PTSD therapist    - Psych currently filling Ritalin   - Will start duloxetine today for chronic pain as below    Acquired hypothyroidism: Reports history of hypothyroidism that is followed by Julieta Bassett (reportedly an endocrinology NP).  The history here is very strange.  Julieta reportedly used to be associated with Dr. Santamaria but now only takes cash pay patients and focuses on holistic medicine.  She is on unusual thyroid medication as listed in the chart.  Julieta has also diagnosed the patient with testosterone deficiency and is supplementing her testosterone.  The patient denies history of adrenal insufficiency and does not maintain on any hydrocortisone.  I told the patient that I have never seen this kind of management before and at the very least recommend a second opinion from an endocrinologist.  She claims that Dr. Evangelista was her endocrinologist but was not willing to prescribe her progesterone.  She can reportedly only get her progesterone from Dr. Santamaria.  The history is very unusual.  I will get basic labs and refer for second opinions from both endocrinology and gynecology.  -     TSH  -     T4, free  -     Ambulatory Referral to Endocrinology    Hormone imbalance: See discussion above.  Patient followed by reported endocrinology NP, Julieta Bassett.  Management seems very unusual to me which I told the patient about.  I specifically told her that I would be unable to fill her medications as I am not comfortable with this management.  I have ordered a few basic labs today and referred to endocrinology and  "gynecology for second opinions.  -     Testosterone  -     Cortisol  -     Ambulatory Referral to Endocrinology  -     Ambulatory Referral to Gynecology  - While I do not endorse, patient continues to take DHEA, \"NP thyroid,\" progesterone, and testosterone because all of her levels \"were zero.\"    Other chronic pain: Reports chronic pain secondary to previous hysterectomy with surgical complications.  I have ordered the below medications for continued management.  Patient also reports chronic left knee pain due to bilateral meniscus tear.  She is followed by Ortho with no surgical intervention planned at this time.  -     gabapentin (NEURONTIN) 300 MG capsule; Please take 600mg QPM and 300mg QAM  -     DULoxetine (CYMBALTA) 30 MG capsule; Take 1 capsule by mouth Daily.   - Cont knee brace and ortho f/u    - Ortho prescribing tramadol    Vitamin D deficiency  -     Vitamin D 25 Hydroxy    Insomnia, unspecified type   - Cont home doxylamine PRN    Preventative:  Colonoscopy: Refused, cologuard today  Mammogram: 3/2019, due 3/2020  Pap smear: Hx full hysterectomy  Shingles: Recommended  Tdap: Ordered today  Influenza: Declined      F/U in 4 months for multiple medical complaints  **Outside records requested from all providers  "

## 2019-12-11 ENCOUNTER — TELEPHONE (OUTPATIENT)
Dept: FAMILY MEDICINE CLINIC | Facility: CLINIC | Age: 49
End: 2019-12-11

## 2019-12-12 ENCOUNTER — TELEPHONE (OUTPATIENT)
Dept: ENDOCRINOLOGY | Facility: CLINIC | Age: 49
End: 2019-12-12

## 2019-12-12 ENCOUNTER — TELEPHONE (OUTPATIENT)
Dept: PSYCHIATRY | Facility: CLINIC | Age: 49
End: 2019-12-12

## 2019-12-12 ENCOUNTER — TELEPHONE (OUTPATIENT)
Dept: FAMILY MEDICINE CLINIC | Facility: CLINIC | Age: 49
End: 2019-12-12

## 2019-12-12 NOTE — TELEPHONE ENCOUNTER
HAD A DETAILED CONVERSATION WITH PATIENT, SHE IS A PATIENT OF DR. DELACRUZ LAST VISIT IN 2017. HAS APPT WITH HER OB/GYN [facility] WHO HAS BEEN TREATING HER HORMONE IMBALANCE/ENDO ISSUES. SHE WILL CALL BACK ONCE SHE SEES WHAT HER OTHER APPT TURNS UP.

## 2019-12-12 NOTE — TELEPHONE ENCOUNTER
Pt called wanting to have PTSD added to her list of diagnosis.    She was last seen 12/4/19  Next visit 3/4/20    Please advise.

## 2019-12-12 NOTE — PROGRESS NOTES
Spoke with pt. She dose not understand why her levels are so high. I tried to explain that the dose of Vit D is to much she is taking 10,000 unites daily. He level was 29 in march. Told her they usually start you on a tonny dose at fist and then take you back to 1000 I.u.daily to help keep your level steady. Regarding her Testosterone level she says she cant believe that it has gotten so high since she only take a very small amount once a week if she remembers. She said her T level was below 2.5 in March. She also wants to know why a DHEA level was not drawn to make sure that level was normal. She stated it was normal at 44 in March. She also spoke with Tiff's office and they do not deal with hormone levels. So she did not make an appt with them.  Last but not least She does not want to stop taking her supplements. She said there is a reason she is on them and she does not want her levels to fluctuate up and down. She said it is not good for her body and it will make her feel  awful. Not sure where you would like to go from here.

## 2019-12-17 NOTE — TELEPHONE ENCOUNTER
Spoke with Raina, she said to advise pt, she can not add a diagnosis to her chart without seeing her in the office, because of insurance. She will discuss it with her when she comes in for her next visit.    Attempted to contact pt, no answer/LVM.

## 2020-01-07 DIAGNOSIS — F90.0 ATTENTION DEFICIT HYPERACTIVITY DISORDER (ADHD), PREDOMINANTLY INATTENTIVE TYPE: ICD-10-CM

## 2020-01-07 RX ORDER — METHYLPHENIDATE HYDROCHLORIDE 10 MG/1
TABLET ORAL
Qty: 90 TABLET | Refills: 0 | Status: SHIPPED | OUTPATIENT
Start: 2020-01-07 | End: 2020-02-04 | Stop reason: SDUPTHER

## 2020-01-07 RX ORDER — METHYLPHENIDATE HYDROCHLORIDE 10 MG/1
TABLET ORAL
Qty: 90 TABLET | Refills: 0 | Status: SHIPPED | OUTPATIENT
Start: 2020-01-07 | End: 2020-01-07 | Stop reason: SDUPTHER

## 2020-01-21 ENCOUNTER — TELEPHONE (OUTPATIENT)
Dept: FAMILY MEDICINE CLINIC | Facility: CLINIC | Age: 50
End: 2020-01-21

## 2020-01-21 NOTE — TELEPHONE ENCOUNTER
Pt saw you on 12/9/19 for OV. Pt has been getting Tramadol 50mg 1 po q8h prn from Dr. Taylor, [facility], for her torn meniscus in her knee. Dr. Taylor states she needs to get this med from her PCP and she is asking if you would send to pharmacy. She has 2 autistic children and just doesn't have time to be off work for 6-8 weeks to have surgery. Thank you.

## 2020-01-22 DIAGNOSIS — G89.4 CHRONIC PAIN SYNDROME: Primary | ICD-10-CM

## 2020-01-22 RX ORDER — TRAMADOL HYDROCHLORIDE 50 MG/1
50 TABLET ORAL 2 TIMES DAILY PRN
Qty: 60 TABLET | Refills: 0 | Status: SHIPPED | OUTPATIENT
Start: 2020-01-22 | End: 2020-02-25

## 2020-01-22 NOTE — TELEPHONE ENCOUNTER
Patient called back again. She contacted [facility] and [facility] [place] and she has a medication contract stating she agrees to take her ADHD med as prescribed, but they do not prescribe pain med. She wanted me to pass this along to you. Seemed quite upset. Thank you.

## 2020-01-22 NOTE — TELEPHONE ENCOUNTER
Pt called back and I gave her message. She states she does not have a pain contract with [facility]. She does get her ADHD med from them, but that is all. Thanks for your help.

## 2020-01-23 NOTE — TELEPHONE ENCOUNTER
Not trying to upset her. It does say she has a pain contract. Nonetheless, I have refilled her tramadol. However, she is asking for more than double what her previous script was written for. I have ordered 50mg BID PRN severe pain

## 2020-01-28 NOTE — TELEPHONE ENCOUNTER
Finally was able to get in touch with patient today. She states she p/u her Tramadol yesterday (1/27/20). She asked me to tell you that she she has known bilateral meniscus tears with Baker's cyst. Yesterday going from sitting to standing position, her knee gave out totally. She again mention her autistic children that require full time care and her inability to get surgery. She wants to know if you will continue RX'ing Tramadol, or do you want her to see Pain Management. Thank you.

## 2020-01-28 NOTE — TELEPHONE ENCOUNTER
I am comfortable w/ what I recently filled. If requiring more, we will have to explore alternatives or refer to pain mgmt

## 2020-02-04 DIAGNOSIS — F90.0 ATTENTION DEFICIT HYPERACTIVITY DISORDER (ADHD), PREDOMINANTLY INATTENTIVE TYPE: ICD-10-CM

## 2020-02-04 RX ORDER — METHYLPHENIDATE HYDROCHLORIDE 10 MG/1
TABLET ORAL
Qty: 90 TABLET | Refills: 0 | Status: SHIPPED | OUTPATIENT
Start: 2020-02-04 | End: 2020-03-04 | Stop reason: SDUPTHER

## 2020-02-13 ENCOUNTER — TELEPHONE (OUTPATIENT)
Dept: FAMILY MEDICINE CLINIC | Facility: CLINIC | Age: 50
End: 2020-02-13

## 2020-02-13 NOTE — TELEPHONE ENCOUNTER
Call pt to find out if she plans on doing the cologaurd. We received a paper stating that pt did not turn sample in.

## 2020-02-25 DIAGNOSIS — G89.4 CHRONIC PAIN SYNDROME: ICD-10-CM

## 2020-02-25 RX ORDER — TRAMADOL HYDROCHLORIDE 50 MG/1
TABLET ORAL
Qty: 14 TABLET | Refills: 0 | Status: SHIPPED | OUTPATIENT
Start: 2020-02-25 | End: 2020-03-28

## 2020-02-28 ENCOUNTER — TELEPHONE (OUTPATIENT)
Dept: FAMILY MEDICINE CLINIC | Facility: CLINIC | Age: 50
End: 2020-02-28

## 2020-02-28 NOTE — TELEPHONE ENCOUNTER
Pt called saying that the traMADol (ULTRAM) 50 MG tablet needs a PA. She says that the pharmacy was supposed to have sent over paper work to do it, but have not gotten anything back. Please call the Pt with an update.

## 2020-03-04 ENCOUNTER — OFFICE VISIT (OUTPATIENT)
Dept: PSYCHIATRY | Facility: CLINIC | Age: 50
End: 2020-03-04

## 2020-03-04 VITALS — SYSTOLIC BLOOD PRESSURE: 130 MMHG | DIASTOLIC BLOOD PRESSURE: 88 MMHG

## 2020-03-04 DIAGNOSIS — F43.12 CHRONIC POST-TRAUMATIC STRESS DISORDER (PTSD): ICD-10-CM

## 2020-03-04 DIAGNOSIS — F99 INSOMNIA DUE TO OTHER MENTAL DISORDER: ICD-10-CM

## 2020-03-04 DIAGNOSIS — F51.05 INSOMNIA DUE TO OTHER MENTAL DISORDER: ICD-10-CM

## 2020-03-04 DIAGNOSIS — F90.2 ATTENTION DEFICIT HYPERACTIVITY DISORDER (ADHD), COMBINED TYPE: Primary | ICD-10-CM

## 2020-03-04 PROCEDURE — 99213 OFFICE O/P EST LOW 20 MIN: CPT | Performed by: PHYSICIAN ASSISTANT

## 2020-03-04 RX ORDER — HYDROXYZINE HYDROCHLORIDE 10 MG/1
10 TABLET, FILM COATED ORAL 3 TIMES DAILY PRN
Qty: 90 TABLET | Refills: 2 | Status: SHIPPED | OUTPATIENT
Start: 2020-03-04 | End: 2020-06-22

## 2020-03-04 RX ORDER — METHYLPHENIDATE HYDROCHLORIDE 10 MG/1
TABLET ORAL
Qty: 90 TABLET | Refills: 0 | Status: SHIPPED | OUTPATIENT
Start: 2020-03-04 | End: 2020-04-02 | Stop reason: SDUPTHER

## 2020-03-04 RX ORDER — DIAZEPAM 5 MG/1
5 TABLET ORAL NIGHTLY PRN
Qty: 30 TABLET | Refills: 2 | Status: SHIPPED | OUTPATIENT
Start: 2020-03-04 | End: 2020-06-22

## 2020-03-04 NOTE — PROGRESS NOTES
"Subjective   Nessa LORY Esqueda is a 49 y.o. white female who presents today for follow up    Chief Complaint:  Depression, insomnia, ADHD     History of Present Illness:   Her two grandkids are having psych issues, one depressed with SI and admitted to [facility], the other is having defiant behavior and aggressive with teachers  Still working on getting extra help for her ASD son (10 yrs old), he is depressed, looking for counselor fo him   Daughter with ASD also having more behaviors  No support from   She is still going to counseling regularly  Anxiety 8/10 due to her children's and grandchildren issues  Chronic insomnia  Depression 5/10, cannot tolerate most meds or won't try for fear of \"brain altering\"  Doing fine on Ritalin, no need for changes, more attentive   Denies Si/HI  Does not like driving anymore and won't get on interstate    The following portions of the patient's history were reviewed and updated as appropriate: allergies, current medications, past family history, past medical history, past social history, past surgical history and problem list.    PAST PSYCHIATRIC HISTORY  Axis I  Affective/Bipoloar Disorder, Anxiety/Panic Disorder, Attention Deficit Disorder  Axis II  None    PAST OUTPATIENT TREATMENT  Diagnosis treated:  Affective Disorder, Anxiety/Panic Disorder, ADD  Treatment Type:  Medication Management, Supportive Counseling  Prior Psychiatric Medications:  Halcion did not help a lot and makes her restless in bed.  Doxepin did not help  Trazodone made her hair fall out.   Lunesta quit working.  Quetiapine   elavil did not help  Ambien was too \"hypnotic\"  Lunesta   Duloxetine was filled but never took it b/c daughter had bad reaction  Gabapentin  Focalin  Ritalin  Support Groups:  None  Sequelae Of Mental Disorder:  social isolation, family disruption, emotional distress      Interval History  No Change    Side Effects  None    Past psychiatric history was reviewed and compared to " 19 visit and appropriate updates were made.    Past Medical History:  Past Medical History:   Diagnosis Date   • ADHD (attention deficit hyperactivity disorder)    • Cyst of knee joint, left    • Disease of thyroid gland    • Psychiatric illness    • PTSD (post-traumatic stress disorder)    • Torn meniscus lef       Social History:  Social History     Socioeconomic History   • Marital status:      Spouse name: Not on file   • Number of children: Not on file   • Years of education: Not on file   • Highest education level: Not on file   Tobacco Use   • Smoking status: Never Smoker   • Smokeless tobacco: Never Used   Substance and Sexual Activity   • Alcohol use: Yes     Comment: socially   • Drug use: No   • Sexual activity: Defer   Social History Narrative    Has 6 children- 2 out of home, both have ADHD. 15yo and 11yo w/ ASD.  w/ hearing loss. 12yo daughter w/o medical problems       Family History:  Family History   Problem Relation Age of Onset   • Hypertension Mother    • Heart disease Mother    • Endometrial cancer Mother    • Thyroid disease Father    • Prostate cancer Father    • Cancer Father         bile duct   • Breast cancer Sister    • Hypertension Maternal Grandmother    • Throat cancer Maternal Grandfather    • No Known Problems Paternal Grandmother    • No Known Problems Paternal Grandfather        Past Surgical History:  Past Surgical History:   Procedure Laterality Date   • BREAST SURGERY     • BUNIONECTOMY Bilateral    •  SECTION      x's4   • HYSTERECTOMY      still has ovaries       Problem List:  Patient Active Problem List   Diagnosis   • ADHD   • Encounter for routine gynecological examination   • Fatigue   • Hair loss   • Hormone imbalance   • Hypothyroidism   • Insomnia   • Low testosterone   • Magnesium deficiency   • Menstrual spotting   • Palpitations   • Chest pain   • Pelvic pain   • Premenopausal menorrhagia   • Vitamin D deficiency   • Chronic post-traumatic  stress disorder (PTSD)       Allergy:   Allergies   Allergen Reactions   • Lortab [Hydrocodone-Acetaminophen] Shortness Of Breath        Discontinued Medications:  Medications Discontinued During This Encounter   Medication Reason   • methylphenidate (RITALIN) 10 MG tablet Reorder       Current Medications:   Current Outpatient Medications   Medication Sig Dispense Refill   • B Complex Vitamins (VITAMIN B COMPLEX PO) Take  by mouth.     • Cholecalciferol (VITAMIN D PO) Take  by mouth.     • DHEA 10 MG capsule Take  by mouth.     • diazePAM (VALIUM) 5 MG tablet Take 1 tablet by mouth At Night As Needed for Anxiety or Muscle Spasms. 30 tablet 2   • doxylamine (UNISOM) 25 MG tablet Take 25 mg by mouth At Night As Needed for Sleep.     • DULoxetine (CYMBALTA) 30 MG capsule Take 1 capsule by mouth Daily. 90 capsule 1   • gabapentin (NEURONTIN) 300 MG capsule Please take 600mg QPM and 300mg QAM 90 capsule 5   • hydrOXYzine (ATARAX) 10 MG tablet Take 1 tablet by mouth 3 (Three) Times a Day As Needed for Allergies. 90 tablet 2   • MAGNESIUM PO MAGNESIUM CAPS     • Melatonin-Pyridoxine 1-10 MG tablet Take 20 mg by mouth Every Night.     • methylphenidate (RITALIN) 10 MG tablet Take two in the AM and one in the afternoon 90 tablet 0   • Multiple Vitamins-Minerals (ZINC PO) Take 5 mg by mouth Daily.     • NP THYROID 15 MG tablet TAKE ONE TABLET BY MOUTH THREE TIMES A WEEK  4   • NP THYROID 90 MG tablet Take 90 mg by mouth Daily.  2   • progesterone (PROMETRIUM) 100 MG capsule Take 300 mg by mouth Daily.  6   • Specialty Vitamins Products (BIOTIN PLUS KERATIN PO) Take  by mouth.     • Testosterone Propionate (FIRST-TESTOSTERONE MC) 2 % cream Place  on the skin as directed by provider.     • traMADol (ULTRAM) 50 MG tablet TAKE ONE TABLET BY MOUTH TWICE A DAY AS NEEDED FOR SEVERE PAIN 14 tablet 0     Current Facility-Administered Medications   Medication Dose Route Frequency Provider Last Rate Last Dose   • raNITIdine (ZANTAC)  tablet 150 mg  150 mg Oral BID Milton Allen Jr., MD             Review of Symptoms:    Psychiatric/Behavioral: Negative for agitation, behavioral problems, confusion, decreased concentration, dysphoric mood, hallucinations, self-injury, sleep disturbance and suicidal ideas. The patient is mildly nervous/anxious and is not hyperactive.        Physical Exam:   Blood pressure 130/88.    Mental Status Exam:   Hygiene:   good  Cooperation:  Cooperative  Eye Contact:  Good  Psychomotor Behavior:  Appropriate  Affect:  Appropriate  Mood: anxious  Hopelessness: Denies  Speech:  Normal  Thought Process:  Goal directed  Thought Content:  Normal  Suicidal:  None  Homicidal:  None  Hallucinations:  None  Delusion:  None  Memory:  Intact  Orientation:  Person, Place, Time and Situation  Reliability:  good  Insight:  Good  Judgement:  Good  Impulse Control:  Good  Physical/Medical Issues:  No      Mental status exam was reviewed and compared to his 12/4/19 visit and no changes were necessary, the exam was the same.    PHQ-9 Depression Screening  Little interest or pleasure in doing things? 1   Feeling down, depressed, or hopeless? 1   Trouble falling or staying asleep, or sleeping too much? 3   Feeling tired or having little energy? 1   Poor appetite or overeating? 1   Feeling bad about yourself - or that you are a failure or have let yourself or your family down? 1   Trouble concentrating on things, such as reading the newspaper or watching television? 1   Moving or speaking so slowly that other people could have noticed? Or the opposite - being so fidgety or restless that you have been moving around a lot more than usual? 0   Thoughts that you would be better off dead, or of hurting yourself in some way? 0   PHQ-9 Total Score 9   If you checked off any problems, how difficult have these problems made it for you to do your work, take care of things at home, or get along with other people? Somewhat difficult            Never smoker    I advised Nessa of the risks of tobacco use.     Lab Results:   Office Visit on 12/09/2019   Component Date Value Ref Range Status   • Glucose 12/09/2019 98  65 - 99 mg/dL Final   • BUN 12/09/2019 14  6 - 20 mg/dL Final   • Creatinine 12/09/2019 0.91  0.57 - 1.00 mg/dL Final   • Sodium 12/09/2019 139  136 - 145 mmol/L Final   • Potassium 12/09/2019 4.4  3.5 - 5.2 mmol/L Final   • Chloride 12/09/2019 99  98 - 107 mmol/L Final   • CO2 12/09/2019 26.3  22.0 - 29.0 mmol/L Final   • Calcium 12/09/2019 9.7  8.6 - 10.5 mg/dL Final   • Total Protein 12/09/2019 8.2  6.0 - 8.5 g/dL Final   • Albumin 12/09/2019 4.60  3.50 - 5.20 g/dL Final   • ALT (SGPT) 12/09/2019 17  1 - 33 U/L Final   • AST (SGOT) 12/09/2019 29  1 - 32 U/L Final   • Alkaline Phosphatase 12/09/2019 57  39 - 117 U/L Final   • Total Bilirubin 12/09/2019 0.3  0.2 - 1.2 mg/dL Final   • eGFR Non African Amer 12/09/2019 65  >60 mL/min/1.73 Final   • Globulin 12/09/2019 3.6  gm/dL Final   • A/G Ratio 12/09/2019 1.3  g/dL Final   • BUN/Creatinine Ratio 12/09/2019 15.4  7.0 - 25.0 Final   • Anion Gap 12/09/2019 13.7  5.0 - 15.0 mmol/L Final   • Hemoglobin A1C 12/09/2019 4.7  3.5 - 5.6 % Final   • Total Cholesterol 12/09/2019 206* 0 - 200 mg/dL Final   • Triglycerides 12/09/2019 49  0 - 150 mg/dL Final   • HDL Cholesterol 12/09/2019 105* 40 - 60 mg/dL Final   • LDL Cholesterol  12/09/2019 91  0 - 100 mg/dL Final   • VLDL Cholesterol 12/09/2019 9.8  5 - 40 mg/dL Final   • LDL/HDL Ratio 12/09/2019 0.87   Final   • TSH 12/09/2019 2.860  0.270 - 4.200 uIU/mL Final   • Free T4 12/09/2019 0.73* 0.93 - 1.70 ng/dL Final   • 25 Hydroxy, Vitamin D 12/09/2019 143.0* 30.0 - 100.0 ng/ml Final   • Testosterone, Total 12/09/2019 534.00* 2.90 - 40.80 ng/dL Final   • Cortisol 12/09/2019 9.37    mcg/dL Final   • WBC 12/09/2019 6.19  3.40 - 10.80 10*3/mm3 Final   • RBC 12/09/2019 5.02  3.77 - 5.28 10*6/mm3 Final   • Hemoglobin 12/09/2019 15.0  12.0 - 15.9  g/dL Final   • Hematocrit 12/09/2019 44.2  34.0 - 46.6 % Final   • MCV 12/09/2019 88.0  79.0 - 97.0 fL Final   • MCH 12/09/2019 29.9  26.6 - 33.0 pg Final   • MCHC 12/09/2019 33.9  31.5 - 35.7 g/dL Final   • RDW 12/09/2019 13.0  12.3 - 15.4 % Final   • RDW-SD 12/09/2019 41.7  37.0 - 54.0 fl Final   • MPV 12/09/2019 10.3  6.0 - 12.0 fL Final   • Platelets 12/09/2019 271  140 - 450 10*3/mm3 Final   • Neutrophil % 12/09/2019 71.7  42.7 - 76.0 % Final   • Lymphocyte % 12/09/2019 17.3* 19.6 - 45.3 % Final   • Monocyte % 12/09/2019 8.7  5.0 - 12.0 % Final   • Eosinophil % 12/09/2019 1.0  0.3 - 6.2 % Final   • Basophil % 12/09/2019 1.0  0.0 - 1.5 % Final   • Immature Grans % 12/09/2019 0.3  0.0 - 0.5 % Final   • Neutrophils, Absolute 12/09/2019 4.44  1.70 - 7.00 10*3/mm3 Final   • Lymphocytes, Absolute 12/09/2019 1.07  0.70 - 3.10 10*3/mm3 Final   • Monocytes, Absolute 12/09/2019 0.54  0.10 - 0.90 10*3/mm3 Final   • Eosinophils, Absolute 12/09/2019 0.06  0.00 - 0.40 10*3/mm3 Final   • Basophils, Absolute 12/09/2019 0.06  0.00 - 0.20 10*3/mm3 Final   • Immature Grans, Absolute 12/09/2019 0.02  0.00 - 0.05 10*3/mm3 Final   • nRBC 12/09/2019 0.0  0.0 - 0.2 /100 WBC Final       Assessment/Plan   Problems Addressed this Visit        Other    ADHD - Primary    Relevant Medications    hydrOXYzine (ATARAX) 10 MG tablet    methylphenidate (RITALIN) 10 MG tablet    diazePAM (VALIUM) 5 MG tablet    Insomnia    Chronic post-traumatic stress disorder (PTSD)    Relevant Medications    hydrOXYzine (ATARAX) 10 MG tablet    methylphenidate (RITALIN) 10 MG tablet    diazePAM (VALIUM) 5 MG tablet          Visit Diagnoses:    ICD-10-CM ICD-9-CM   1. Attention deficit hyperactivity disorder (ADHD), combined type F90.2 314.01   2. Insomnia due to other mental disorder F51.05 300.9    F99 327.02   3. Chronic post-traumatic stress disorder (PTSD) F43.12 309.81       TREATMENT PLAN/GOALS: Continue supportive psychotherapy efforts and medications  as indicated. Treatment and medication options discussed during today's visit. Patient ackowledged and verbally consented to continue with current treatment plan and was educated on the importance of compliance with treatment and follow-up appointments.    MEDICATION ISSUES:  INSPECT reviewed as expected  Discussed medication options and treatment plan of prescribed medication as well as the risks, benefits, and side effects including potential falls, possible impaired driving and metabolic adversities among others. Patient is agreeable to call the office with any worsening of symptoms or onset of side effects. Patient is agreeable to call 911 or go to the nearest ER should he/she begin having SI/HI. No medication side effects or related complaints today.     Patient is still struggling with a lot of stressors in her life.  Continue Ritalin 10 mg 2 tablets in the morning and 1 tablet in the afternoon as needed.  #90  Trial of Vistaril 10mg TID prn anxiety  Also add Valium 5mg QHS prn severe anxiety muscle tension on days with severe behavioral issues from kids    MEDS ORDERED DURING VISIT:  New Medications Ordered This Visit   Medications   • hydrOXYzine (ATARAX) 10 MG tablet     Sig: Take 1 tablet by mouth 3 (Three) Times a Day As Needed for Allergies.     Dispense:  90 tablet     Refill:  2   • methylphenidate (RITALIN) 10 MG tablet     Sig: Take two in the AM and one in the afternoon     Dispense:  90 tablet     Refill:  0   • diazePAM (VALIUM) 5 MG tablet     Sig: Take 1 tablet by mouth At Night As Needed for Anxiety or Muscle Spasms.     Dispense:  30 tablet     Refill:  2       Return in about 3 months (around 6/4/2020).         This document has been electronically signed by Raina Jordan PA-C  March 5, 2020 9:25 AM

## 2020-03-05 PROBLEM — F43.12 CHRONIC POST-TRAUMATIC STRESS DISORDER (PTSD): Status: ACTIVE | Noted: 2020-03-05

## 2020-03-09 ENCOUNTER — TELEPHONE (OUTPATIENT)
Dept: FAMILY MEDICINE CLINIC | Facility: CLINIC | Age: 50
End: 2020-03-09

## 2020-03-09 NOTE — TELEPHONE ENCOUNTER
I do not have a pa on patient   She needs to verify that pharmacy has both ins cards  And have them send the PA

## 2020-03-09 NOTE — TELEPHONE ENCOUNTER
Pt [facility] today and they state they initially sent the PA request 2/28/20 and have sent multiple times since then. They are sending again today and asking if you would be on lookout for it and let her know when it's been processed and complete. Thank you.

## 2020-03-09 NOTE — TELEPHONE ENCOUNTER
With [facility] this rx is 3.34 she needs to use [facility] I can not believe that the [employer] did not offer to help her with this

## 2020-03-09 NOTE — TELEPHONE ENCOUNTER
PATIENT CALLED IN TO CHECK STATUS OF HER RX FOR traMADol (ULTRAM) 50 MG tablet. PATIENT SATES THAT PHARMACY SENT PAPERWORK OVER TO THE OVER TO BE COMPLETED AND SHE JUST WANTED TO CHECK STATUS.  PA CAN BE SENT TO THE PHARMACY ON FILE.    PLEASE ADVISE

## 2020-03-09 NOTE — TELEPHONE ENCOUNTER
Patient states with her primary and secondary insurance, she should not have to pay anything. Thank you.

## 2020-03-27 DIAGNOSIS — G89.4 CHRONIC PAIN SYNDROME: ICD-10-CM

## 2020-03-27 NOTE — TELEPHONE ENCOUNTER
PATIENT CALLED IN TO FOLLOW UP ON GETTING A 30 DAY SUPPLY  OF Tramadol   [facility]  [address] [facility] . PATIENT CALL BACK [phone]. PLEASE CALL PATIENT AND ADVISE

## 2020-03-28 RX ORDER — TRAMADOL HYDROCHLORIDE 50 MG/1
TABLET ORAL
Qty: 14 TABLET | Refills: 0 | Status: SHIPPED | OUTPATIENT
Start: 2020-03-28 | End: 2020-03-31 | Stop reason: SDUPTHER

## 2020-03-31 ENCOUNTER — TELEPHONE (OUTPATIENT)
Dept: FAMILY MEDICINE CLINIC | Facility: CLINIC | Age: 50
End: 2020-03-31

## 2020-03-31 DIAGNOSIS — G89.4 CHRONIC PAIN SYNDROME: ICD-10-CM

## 2020-03-31 RX ORDER — TRAMADOL HYDROCHLORIDE 50 MG/1
50 TABLET ORAL 2 TIMES DAILY PRN
Qty: 60 TABLET | Refills: 0 | Status: SHIPPED | OUTPATIENT
Start: 2020-03-31 | End: 2020-06-23

## 2020-03-31 NOTE — TELEPHONE ENCOUNTER
Patient called in today again about her Tramadol RX. It appears #14 was sent to pharmacy on 3/28/20. This only gives her 7 days worth of med, and then she has to request another RF. She is again requesting #30 to be sent in due to her meniscal tear, Baker's cyst, and IT band problem. She would like to be called back with answer as to why she can't get #30 of this med. Thank you.

## 2020-04-02 DIAGNOSIS — F90.2 ATTENTION DEFICIT HYPERACTIVITY DISORDER (ADHD), COMBINED TYPE: ICD-10-CM

## 2020-04-02 RX ORDER — METHYLPHENIDATE HYDROCHLORIDE 10 MG/1
TABLET ORAL
Qty: 90 TABLET | Refills: 0 | Status: SHIPPED | OUTPATIENT
Start: 2020-04-02 | End: 2020-05-06 | Stop reason: ALTCHOICE

## 2020-04-02 NOTE — TELEPHONE ENCOUNTER
PATIENT CALLED AND REQUESTED REFILL FOR RITALIN 10MG    VERIFIED PH[facility]ACY IS [facility] IN [place]

## 2020-04-08 ENCOUNTER — TELEPHONE (OUTPATIENT)
Dept: PSYCHIATRY | Facility: CLINIC | Age: 50
End: 2020-04-08

## 2020-04-08 NOTE — TELEPHONE ENCOUNTER
If she wants, we can add another dose or two of the Valium to take two or three times daily as needed instead of one only at bedtime.  If 5mg is too strong during the day, she can break it in half.      Has she tried taking the vistaril (hydroxyzine) as needed for the anxiety?    Can she upgrade her internet and use [employer] to get people online to help with the kids class work?

## 2020-04-08 NOTE — TELEPHONE ENCOUNTER
4/8/20---pt called-- very upset-- crying---  Is very over whelmed--  Has very bad  PTSD--- has 3 children 2  Are special needs autistic-- other one has  ptsd due to her 2 siblings----- school work is  to much  for mom with kids the way they are-- her internet does not wk right-- needs -- its all a mess-- 2 noé go to [facility] with IUP's and the other is at  [facility].--- -- crying says she needs help--- is not taking the Cymbalta-- does not like antidepressants---  goes in at 3 am and when he gets home goes to bed has no help-- is exhausted!!!!--- needs  help

## 2020-04-08 NOTE — TELEPHONE ENCOUNTER
4/8/20---  Will try Valium increase-- just got new rx-- will call back if  Needs meds sooner due to incr in directions/fyi/lks

## 2020-04-15 ENCOUNTER — TELEMEDICINE (OUTPATIENT)
Dept: FAMILY MEDICINE CLINIC | Facility: CLINIC | Age: 50
End: 2020-04-15

## 2020-04-15 DIAGNOSIS — R03.0 BLOOD PRESSURE ELEVATED WITHOUT HISTORY OF HTN: Primary | ICD-10-CM

## 2020-04-15 DIAGNOSIS — E55.9 VITAMIN D DEFICIENCY: ICD-10-CM

## 2020-04-15 DIAGNOSIS — F41.9 ANXIETY: ICD-10-CM

## 2020-04-15 DIAGNOSIS — F90.9 ATTENTION DEFICIT HYPERACTIVITY DISORDER (ADHD), UNSPECIFIED ADHD TYPE: ICD-10-CM

## 2020-04-15 DIAGNOSIS — R79.89 LOW TESTOSTERONE: ICD-10-CM

## 2020-04-15 DIAGNOSIS — E34.9 HORMONE IMBALANCE: ICD-10-CM

## 2020-04-15 PROCEDURE — 99214 OFFICE O/P EST MOD 30 MIN: CPT | Performed by: FAMILY MEDICINE

## 2020-04-15 NOTE — PROGRESS NOTES
**Patient unable to connect a video visit due to technical difficulties.  Visit had to be switched to telephone visit for these reasons.    CC: anxiety, lab abnormalities    HPI  Nessa Esqueda is a 49 y.o. female that presents for f/u of multiple medical problems    HTN: not taking BP at home.  Not maintained on any medication at this time    Testosterone supplementation: patient had f/u Julieta Copas. Her testosterone level was normal in January not on supplementation. She continues not on supplementation    Vitamin D deficiency: patient's last level was 143 and I instructed her to stop supplementation. Julieta Bassett recommended supplement every other day, which she has been doing    Patient continues to take biotin, DHEA, progesterone 300mg daily, thyroid 90mg daily, zinc from [facility].     Anxiety/depression: patient states that she had psychiatric testing in February w/ severe anxiety, severe PTSD, and mild depression. She is seeing a therapist regularly. There have been a few recent changes that have helped relieve some stress. She is being followed by Raina Jordan, who is prescribing Valium. Patient is no longer taking duloxetine but continues to take hydroxyzine 10mg TID PRN    ADD: taking Ritalin 20mg QAM and 10mg QPM, prescribed by Raina Malagon from [facility] on 1/2020...  Progesterone: 2.43  DHEA: 64  Testosterone: 24  Estradiol: 270  TSH: 0.27  Free T4: 1.03  Arginine: 49  Vitd: 137  VitB12: 594    Review of Systems   Constitutional: Negative for chills, fever and unexpected weight loss.   HENT: Negative for congestion and rhinorrhea.    Respiratory: Negative for cough and shortness of breath.    Cardiovascular: Negative for chest pain and palpitations.   Gastrointestinal: Negative for abdominal pain and vomiting.   Skin: Negative for rash.   Psychiatric/Behavioral: Positive for decreased concentration and stress. The patient is nervous/anxious.      The following portions of the patient's  history were reviewed and updated as appropriate: problem list, past medical history, past surgical history, allergies, current medications    Problem List Tab  Patient History Tab  Immunizations Tab  Medications Tab  Chart Review Tab  Care Everywhere Tab  Synopsis Tab    PE  **Exam limited by telephone visit  There were no vitals filed for this visit.  There is no height or weight on file to calculate BMI.  General: NAD  Resp: No dyspnea  Neuro: Alert and oriented. No focal deficits    Imaging  No Images in the past 120 days found..    Assessment/Plan   Nessa Esqueda is a 49 y.o. female that presents for No chief complaint on file.    Diagnoses and all orders for this visit:    Blood pressure elevated without history of HTN: Patient not taking blood pressure at home, limiting evaluation   -Monitor    Anxiety: Patient is being followed by psychiatry who is prescribing hydroxyzine and Valium as needed.  I recommended the use of an SSRI to better control her anxiety and depression today.  At this time, the patient is opposed but will consider in the future.   -Continue psychiatry follow-up   -Hydroxyzine and Valium per psych   -Recommend SSRI    Low testosterone: Patient reports that her last testosterone value with Julieta Copas was normal.  She is subsequently discontinued her testosterone supplementation, which I supported    Vitamin D deficiency: Last vitamin D value was 143.  I recommended the patient discontinue her vitamin D supplementation.  However, Julieta Copas is recommended that the patient reduce her vitamin D supplementation to every other day, which the patient continues to do.   -Recommend discontinuing vitamin D supplementation    Hormone imbalance: Patient continues to use DHEA, progesterone, and numerous other supplements from Julieta Copas.  We have previously discussed that I do not feel that testosterone or DHEA are indicated or appropriate.  This has made me skeptical regarding much of her care  but patient continues to receive care there and seems to prefer this management.   -Per Julieta Bassett    Attention deficit hyperactivity disorder (ADHD), unspecified ADHD type   -Ritalin 20 mg every morning and 10 mg every afternoon per psych      Total time spent on phone- 30:55    You have chosen to receive care through a telephone visit. Do you consent to use a telephone visit for your medical care today? Yes    This visit has been rescheduled as a phone visit to comply with patient safety concerns in accordance with CDC recommendations. Total time of discussion was 31 minutes.

## 2020-04-22 ENCOUNTER — TELEPHONE (OUTPATIENT)
Dept: PSYCHIATRY | Facility: CLINIC | Age: 50
End: 2020-04-22

## 2020-04-22 NOTE — TELEPHONE ENCOUNTER
4/22/20--PT CALLED--  IS TELLING HER SHE IS TALKING IN HER SLEEP-- DOING THINGS AND SHE DOES NOT KNOW IT-- HAVING NIGHT TERRORS--  SAYS SHE IS SNORING AND THRASHING IN THE NIGHT---- ONE NIGHT SHE TOOK JUST 2 MELATONINS AND 1/2 OF BENADRYL AND HAD  THE NIGHT TERRORS TOO---- FROM WHAT I CAN GET OUT OF  PATIENT SHE TAKES VALIUM BUT HATES TO TAKE IT IS ON RITALIN AND VISTERIL-- DOES NOT WANT ON A ANTIDEPRESSANT BECAUSE SHE DOES NOT DO GOOD WITH THEM--- HAS PTSD SEVERE SHE STATED AND SEVERE ANXIETY-----PLEASE ADVISE----LKS             4/22/20--PT CALLED BACK--- FEELS NAUSEATED/ IS AT THE END OF HER ROPE-- SHE CALLED BACK-- SEES THERAPIST AND HAS TALKED ABOUT ALL 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

## 2020-04-23 NOTE — TELEPHONE ENCOUNTER
If she cannot get her  to help with the kids so she can get some relief or get herself into the hospital, then I would like her to try Risperdal, which is a med we use for bipolar but we also use it a lot to help with anxiety and agitation.  I will give her the lowest dosage possible and she can continue the Valium as needed.

## 2020-04-24 NOTE — TELEPHONE ENCOUNTER
4/24/20-- Spoke to pt and informed her of what you said about getting help or  Going to hospital ( she made no comment)-- she said she has tried Risperdal in the past-- it made her gain weight-- she said no to that-- she said she will take the Valium as needed-- please advise /lks

## 2020-04-30 NOTE — TELEPHONE ENCOUNTER
Please call Nessa to check on how she is doing.  And tell her that I would recommend she take something that may help the anxiety and stop worrying about the weight gain as much.  If Risperdal is used AS NEEDED, instead of taking it routinely, she should not have issues with weight gain.

## 2020-04-30 NOTE — TELEPHONE ENCOUNTER
4/30/20-- SPOKE TO  PT --  SHE IS CURRENTLY TAKING VALIUM 5MG TWICE A DAY AND ATARAX 10MG 2 TO 3 TIMES A DAY AND FEELS THAT COMBINATION HELPS----SHE IS LEARY OF TAKING  RISPERDOL AND SAID SHE WILL CALL BACK IF SHE FEELS THAT MEDICATION NEEDS TO BE ADDED, BUT CURRRENTLY DOES NOT WANT TO TAKE IT---MONI/RACHEL

## 2020-05-04 ENCOUNTER — TELEPHONE (OUTPATIENT)
Dept: PSYCHIATRY | Facility: CLINIC | Age: 50
End: 2020-05-04

## 2020-05-04 DIAGNOSIS — F90.2 ATTENTION DEFICIT HYPERACTIVITY DISORDER (ADHD), COMBINED TYPE: Primary | ICD-10-CM

## 2020-05-04 NOTE — TELEPHONE ENCOUNTER
5/4/20-- Pt wants to know if you will retry her back on Focalin-- she stated she was on that before-- instead of Ritalin ( due to be filled)-- please let her know [phone]--- uses [facility]

## 2020-05-06 RX ORDER — DEXMETHYLPHENIDATE HYDROCHLORIDE 10 MG/1
10 TABLET ORAL 2 TIMES DAILY
Qty: 60 TABLET | Refills: 0 | Status: SHIPPED | OUTPATIENT
Start: 2020-05-06 | End: 2020-05-21 | Stop reason: SDUPTHER

## 2020-05-07 NOTE — TELEPHONE ENCOUNTER
5/7/20- pa approved for Focalin 10mg bid/lks--MD Whitten-- approved until 11/5/2020([phone] pa dept)

## 2020-05-19 ENCOUNTER — TELEPHONE (OUTPATIENT)
Dept: PSYCHIATRY | Facility: CLINIC | Age: 50
End: 2020-05-19

## 2020-05-19 DIAGNOSIS — F90.2 ATTENTION DEFICIT HYPERACTIVITY DISORDER (ADHD), COMBINED TYPE: ICD-10-CM

## 2020-05-19 NOTE — TELEPHONE ENCOUNTER
5/19/20--PT CALLED ON FOCALIN 10MG-- DIRECTIONS FROM  5/6/20 WHERE ONE BID---- SHE DID NOT  RX-- WAS [place] TOWN-- SHE WANTS TO KNOW IF YOU COULD WRITE THE RX FOR  2 IN AM AND 1 IN AFTERNOON---- ( I CALLED [facility] AND THEY HAVE GOT THE RX FROM  5/6/20 THERE ON HOLD)--PLEASE ADVISE ON NEW DIRECTIONS AND NEW RX FOR FOCALIN 10MG--/RACHEL--PT CALL BACK #[phone]

## 2020-05-21 RX ORDER — DEXMETHYLPHENIDATE HYDROCHLORIDE 10 MG/1
TABLET ORAL
Qty: 90 TABLET | Refills: 0 | Status: SHIPPED | OUTPATIENT
Start: 2020-05-21 | End: 2020-06-22 | Stop reason: SDUPTHER

## 2020-06-04 ENCOUNTER — TELEMEDICINE (OUTPATIENT)
Dept: PSYCHIATRY | Facility: CLINIC | Age: 50
End: 2020-06-04

## 2020-06-04 DIAGNOSIS — F99 INSOMNIA DUE TO OTHER MENTAL DISORDER: ICD-10-CM

## 2020-06-04 DIAGNOSIS — F51.05 INSOMNIA DUE TO OTHER MENTAL DISORDER: ICD-10-CM

## 2020-06-04 DIAGNOSIS — F43.12 CHRONIC POST-TRAUMATIC STRESS DISORDER (PTSD): ICD-10-CM

## 2020-06-04 DIAGNOSIS — F90.2 ATTENTION DEFICIT HYPERACTIVITY DISORDER (ADHD), COMBINED TYPE: Primary | ICD-10-CM

## 2020-06-04 PROCEDURE — 99213 OFFICE O/P EST LOW 20 MIN: CPT | Performed by: PHYSICIAN ASSISTANT
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
## 2020-06-22 DIAGNOSIS — G89.4 CHRONIC PAIN SYNDROME: ICD-10-CM

## 2020-06-22 DIAGNOSIS — F90.2 ATTENTION DEFICIT HYPERACTIVITY DISORDER (ADHD), COMBINED TYPE: ICD-10-CM

## 2020-06-22 DIAGNOSIS — F43.12 CHRONIC POST-TRAUMATIC STRESS DISORDER (PTSD): ICD-10-CM

## 2020-06-22 RX ORDER — HYDROXYZINE HYDROCHLORIDE 10 MG/1
TABLET, FILM COATED ORAL
Qty: 90 TABLET | Refills: 2 | Status: SHIPPED | OUTPATIENT
Start: 2020-06-22 | End: 2021-05-03

## 2020-06-22 RX ORDER — DEXMETHYLPHENIDATE HYDROCHLORIDE 10 MG/1
TABLET ORAL
Qty: 90 TABLET | Refills: 0 | Status: SHIPPED | OUTPATIENT
Start: 2020-06-22 | End: 2020-07-28 | Stop reason: SDUPTHER

## 2020-06-22 RX ORDER — DIAZEPAM 5 MG/1
TABLET ORAL
Qty: 30 TABLET | Refills: 2 | Status: SHIPPED | OUTPATIENT
Start: 2020-06-22 | End: 2020-09-29 | Stop reason: SDUPTHER

## 2020-06-23 RX ORDER — TRAMADOL HYDROCHLORIDE 50 MG/1
TABLET ORAL
Qty: 60 TABLET | Refills: 0 | Status: SHIPPED | OUTPATIENT
Start: 2020-06-23 | End: 2020-12-07

## 2020-06-25 ENCOUNTER — TELEPHONE (OUTPATIENT)
Dept: PSYCHIATRY | Facility: CLINIC | Age: 50
End: 2020-06-25
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
## 2020-06-25 NOTE — TELEPHONE ENCOUNTER
She has the Valium and hydroxyzine for the anxiety.  How long before the PA is done?  So does she want to try the Duloxetine or could try Wellbutrin, which does not cause weight gain.  Otherwise, I am not sure what she wants me to do if she doesn't want to take any meds.  Has she had any luck finding a therapist?

## 2020-07-28 DIAGNOSIS — F90.2 ATTENTION DEFICIT HYPERACTIVITY DISORDER (ADHD), COMBINED TYPE: ICD-10-CM

## 2020-07-28 RX ORDER — DEXMETHYLPHENIDATE HYDROCHLORIDE 10 MG/1
TABLET ORAL
Qty: 90 TABLET | Refills: 0 | Status: SHIPPED | OUTPATIENT
Start: 2020-07-28 | End: 2020-09-08 | Stop reason: SDUPTHER

## 2020-08-03 ENCOUNTER — TELEPHONE (OUTPATIENT)
Dept: PSYCHIATRY | Facility: CLINIC | Age: 50
End: 2020-08-03

## 2020-08-03 NOTE — TELEPHONE ENCOUNTER
Pt called to day to ask for a note-stating she can not be under the mental stress of virtual schooling for her 3 children and grandbhaveshter. 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 Children are currently out of school due to Covid -19. She states she is seeing 3 providers for mental health- You, another provider who only does labs, and a therapist. She stated the provider who does her labs stated that the stress in her life is killing her. She wants a diagnosis of PTSD- her child has dx of PTSD.

## 2020-08-07 NOTE — TELEPHONE ENCOUNTER
Per call with patient today: The patient is asking for these notes. She says she cant wear a mask due to PTSD, and she is having to virtual school w her children and it causes her too much stress.She stated she is not trying to get disability, she just wants help with taking care of her kids( from outside agency- due to special needs) and she is under a lot of stress.

## 2020-08-12 NOTE — TELEPHONE ENCOUNTER
She already has PTSD on her problem list.  You can prepare a letter with her diagnosis and state that she cannot tolerate wearing a mask for extended periods of time

## 2020-08-21 ENCOUNTER — TRANSCRIBE ORDERS (OUTPATIENT)
Dept: PHYSICAL THERAPY | Facility: CLINIC | Age: 50
End: 2020-08-21

## 2020-08-21 DIAGNOSIS — M76.32 IT BAND SYNDROME, LEFT: Primary | ICD-10-CM

## 2020-08-24 ENCOUNTER — TELEPHONE (OUTPATIENT)
Dept: PHYSICAL THERAPY | Facility: CLINIC | Age: 50
End: 2020-08-24

## 2020-08-24 ENCOUNTER — TELEPHONE (OUTPATIENT)
Dept: FAMILY MEDICINE CLINIC | Facility: CLINIC | Age: 50
End: 2020-08-24

## 2020-08-24 ENCOUNTER — TREATMENT (OUTPATIENT)
Dept: PHYSICAL THERAPY | Facility: CLINIC | Age: 50
End: 2020-08-24

## 2020-08-24 DIAGNOSIS — M76.32 IT BAND SYNDROME, LEFT: Primary | ICD-10-CM

## 2020-08-24 PROCEDURE — 97110 THERAPEUTIC EXERCISES: CPT | Performed by: PHYSICAL THERAPIST

## 2020-08-24 PROCEDURE — 97162 PT EVAL MOD COMPLEX 30 MIN: CPT | Performed by: PHYSICAL THERAPIST
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
## 2020-08-24 NOTE — TELEPHONE ENCOUNTER
Patients wife called  He forgot his Bp meds at his out of town house  He would like 10 pills called into the pharmacy  Pt returned therapist's phone call. Pt reports increased LBP since eval, which has decreased somewhat with heating pad per her report. Pt advised to hold off on HEP this evening and resume tomorrow as tolerated. Pt to decrease repetitions of exercises as needed. She voices understanding.

## 2020-08-24 NOTE — PROGRESS NOTES
Physical Therapy Initial Evaluation and Plan of Care    Patient: Nessa Esqueda   : 1970  Diagnosis/ICD-10 Code:  It band syndrome, left [M76.32]  Referring practitioner: Jalen Taylor MD  Date of Initial Visit: 2020  Today's Date: 2020  Patient seen for 1 sessions           Subjective Questionnaire: LEFS 16%      Subjective 48 yo female with c/o L LE pain, LBP, bilateral hip pain, and R foot pain with referral for IT band syndrome. Primary pain is along the left lateral knee. L knee pain began ~2 years with insidious onset and pt dx with  Medial meniscus tear. Pt was scheduled for a meniscectomy but this was not completed. Pt then developed and lateral tear and a Baker's cyst. 4-5/10 knee pain now and 10/10 at worst. Symptoms worsen as the day progresses, prolonged standing, prolonged walking. Symptoms improve when applying heat to her hip. Further alleviating and exacerbating factors are unknown. Pt notes home stress related to taking care of several special needs children and is discussion this with a mental health counselor. Denies numbness and tingling.  Social hx: Unemployed, takes care of her special needs children  MD follow up: prn      Objective          Tenderness   Left Knee   Tenderness in the ITB, lateral joint line and medial joint line.     Active Range of Motion   Left Knee   Flexion: 118 degrees   Extension: Left knee active extension: -7.     Right Knee   Normal active range of motion    Strength/Myotome Testing     Left Hip   Planes of Motion   Abduction: 3+    Isolated Muscles   Gluteus dotty: 3+    Left Knee   Flexion: 3+  Extension: 4-    Right Knee   Normal strength    Tests     Left Hip   Positive modified Nydia.   Modified James: Positive.     Left Knee   Positive Apley's compression and medial Michael.       Further testing deferred due to c/o pain    Assessment & Plan     Assessment  Impairments: abnormal or restricted ROM, activity intolerance, impaired physical  strength, lacks appropriate home exercise program and pain with function  Assessment details: 50 yo female with c/o L LE pain. Pt is with a good response to low level therapeutic exercise this date and would benefit from further evaluation and treatment to address the above impairments.  Prognosis: good  Functional Limitations: lifting, walking, pulling, pushing, uncomfortable because of pain and standing  Goals  Plan Goals: Short term goals, 1 week: Tolerate HEP progression.  Voice compliance with activity modification.  Report improvement in symptoms.    LTGs, 5 weeks: Improve LEFS to 60%.  Knee AROM to be 0-120 deg.  4+/5 LE strength throughout.  Independent with HEP.        Plan  Therapy options: will be seen for skilled physical therapy services  Other planned modality interventions: modalities prn  Planned therapy interventions: balance/weight-bearing training, flexibility, functional ROM exercises, home exercise program, manual therapy, neuromuscular re-education, postural training, strengthening, stretching and therapeutic activities  Frequency: 1-2 times per week.  Duration in visits: 10  Treatment plan discussed with: patient        Timed:         Manual Therapy:         mins  12596;     Therapeutic Exercise:    28     mins  46608;     Neuromuscular Arcelia:        mins  88982;    Therapeutic Activity:          mins  50935;     Gait Training:           mins  36243;     Ultrasound:          mins  36311;    Ionto                                   mins   51148  Self Care                            mins   40899    Un-Timed:  Electrical Stimulation:         mins  24322 ( );  Traction          mins 10228  Low Eval          Mins  36450  Mod Eval     25     Mins  63447  High Eval                            Mins  56920  Re-Eval                               mins  76317        Timed Treatment:   28   mins   Total Treatment:     53   mins    PT SIGNATURE: Britton Brooks PT, DPT, OCS  [place] license: [id number]  DATE  TREATMENT INITIATED: 8/24/2020    Initial Certification  Certification Period: 11/22/2020  I certify that the therapy services are furnished while this patient is under my care.  The services outlined above are required for this patient and will be reviewed every 90 days.     PHYSICIAN: _____________________________    Jalen Taylor MD      DATE:     Please sign and return via fax to [phone]. Thank you, [facility].   English

## 2020-08-25 ENCOUNTER — LAB (OUTPATIENT)
Dept: FAMILY MEDICINE CLINIC | Facility: CLINIC | Age: 50
End: 2020-08-25

## 2020-08-25 ENCOUNTER — OFFICE VISIT (OUTPATIENT)
Dept: FAMILY MEDICINE CLINIC | Facility: CLINIC | Age: 50
End: 2020-08-25

## 2020-08-25 VITALS
DIASTOLIC BLOOD PRESSURE: 62 MMHG | HEART RATE: 116 BPM | WEIGHT: 127 LBS | RESPIRATION RATE: 16 BRPM | TEMPERATURE: 98.4 F | OXYGEN SATURATION: 99 % | HEIGHT: 62 IN | SYSTOLIC BLOOD PRESSURE: 128 MMHG | BODY MASS INDEX: 23.37 KG/M2

## 2020-08-25 DIAGNOSIS — M54.50 BILATERAL LOW BACK PAIN WITHOUT SCIATICA, UNSPECIFIED CHRONICITY: ICD-10-CM

## 2020-08-25 DIAGNOSIS — M79.7 FIBROMYALGIA: Primary | ICD-10-CM

## 2020-08-25 PROCEDURE — 86200 CCP ANTIBODY: CPT | Performed by: FAMILY MEDICINE

## 2020-08-25 PROCEDURE — 99213 OFFICE O/P EST LOW 20 MIN: CPT | Performed by: FAMILY MEDICINE

## 2020-08-25 PROCEDURE — 36415 COLL VENOUS BLD VENIPUNCTURE: CPT | Performed by: FAMILY MEDICINE

## 2020-08-25 PROCEDURE — 86140 C-REACTIVE PROTEIN: CPT | Performed by: FAMILY MEDICINE

## 2020-08-25 PROCEDURE — 85025 COMPLETE CBC W/AUTO DIFF WBC: CPT | Performed by: FAMILY MEDICINE

## 2020-08-25 PROCEDURE — 86431 RHEUMATOID FACTOR QUANT: CPT | Performed by: FAMILY MEDICINE

## 2020-08-25 PROCEDURE — 86038 ANTINUCLEAR ANTIBODIES: CPT | Performed by: FAMILY MEDICINE

## 2020-08-25 PROCEDURE — 80053 COMPREHEN METABOLIC PANEL: CPT | Performed by: FAMILY MEDICINE

## 2020-08-25 PROCEDURE — 85652 RBC SED RATE AUTOMATED: CPT | Performed by: FAMILY MEDICINE

## 2020-08-25 NOTE — PROGRESS NOTES
Chief Complaint   Patient presents with   • Arthritis     HPI  Nessa Esqueda is a 49 y.o. female that presents for arthritis    Patient had been seeing Dr Oconnor ([facility]) for reported meniscal tear. He diagnosed w/ IT band syndrome. Patient referred to physical therapy for IT band. PT was concerned that this is not typical of IT band syndrome. PT concerned about RA or fibromyalgia.     Patient reports low back and L knee pain that has been exacerbated by walking. She reports both joint and muscle pain. Denies sciatica. No joint swelling    Review of Systems   Constitutional: Negative for chills, fever and unexpected weight loss.   HENT: Negative for congestion and rhinorrhea.    Eyes: Negative for visual disturbance.   Respiratory: Negative for cough and shortness of breath.    Cardiovascular: Negative for chest pain and palpitations.   Gastrointestinal: Negative for abdominal pain and vomiting.   Genitourinary: Negative for dysuria.   Musculoskeletal: Positive for arthralgias, back pain, gait problem, myalgias, neck pain and neck stiffness. Negative for joint swelling.   Skin: Negative for rash.     The following portions of the patient's history were reviewed and updated as appropriate: problem list, past medical history, past surgical history, allergies, current medication    Problem List Tab  Patient History Tab  Immunizations Tab  Medications Tab  Chart Review Tab  Care Everywhere Tab  Synopsis Tab    PE  Vitals:    08/25/20 1522   BP: 128/62   Pulse: 116   Resp: 16   Temp: 98.4 °F (36.9 °C)   SpO2: 99%     Body mass index is 23.23 kg/m².  General: Well nourished, NAD  Head: AT/NC  Eyes: EOMI, anicteric sclera  ENT: MMM w/o erythema  Neck: Supple, no thyromegaly or LAD  Resp: CTAB, SCR, BS equal  CV: RRR w/o m/r/g; 2+ pulses  GI: Soft, NT/ND, +BS  MSK: FROM, no deformity, no edema. Pressure points TTP diffusely to lateral knee and thigh, bilateral low back, bilateral trapezius, bilateral base of  neck  Skin: Warm, dry, intact  Neuro: Alert and oriented. No focal deficits  Psych: Appropriate mood and affect    Imaging  No Images in the past 120 days found..    Assessment/Plan   Nessaevan Esqueda is a 49 y.o. female that presents for   Chief Complaint   Patient presents with   • Arthritis     Nessa was seen today for arthritis.    Diagnoses and all orders for this visit:    Fibromyalgia: Patient is diffusely tender over trigger points.  She has no evidence for joint swelling.  I do believe this is most consistent with fibromyalgia but will obtain some lab work-up as below dementia nothing else is going on.  -     C-reactive Protein  -     Sedimentation Rate  -     CBC & Differential  -     Comprehensive Metabolic Panel  -     ERIN  -     Rheumatoid Factor, Quant  -     Cyclic Citrul Peptide Antibody, IgG / IgA  -     CBC Auto Differential  - Counseled regarding need for regular exercise  - Recommend ibuprofen 600 mg 3 times daily as needed  - We discussed the need for duloxetine gabapentin/Lyrica to optimize pain control of her fibromyalgia.  Patient prefers to wait at this time.    Bilateral low back pain without sciatica, unspecified chronicity  -     XR Spine Lumbar 2 or 3 View; Future    Follow-up as needed

## 2020-08-26 LAB
ALBUMIN SERPL-MCNC: 4.8 G/DL (ref 3.5–5.2)
ALBUMIN/GLOB SERPL: 1.6 G/DL
ALP SERPL-CCNC: 59 U/L (ref 39–117)
ALT SERPL W P-5'-P-CCNC: 19 U/L (ref 1–33)
ANA SER QL: NEGATIVE
ANION GAP SERPL CALCULATED.3IONS-SCNC: 11.7 MMOL/L (ref 5–15)
AST SERPL-CCNC: 25 U/L (ref 1–32)
BASOPHILS # BLD AUTO: 0.05 10*3/MM3 (ref 0–0.2)
BASOPHILS NFR BLD AUTO: 0.9 % (ref 0–1.5)
BILIRUB SERPL-MCNC: 0.6 MG/DL (ref 0–1.2)
BUN SERPL-MCNC: 15 MG/DL (ref 6–20)
BUN/CREAT SERPL: 18.1 (ref 7–25)
CALCIUM SPEC-SCNC: 9.8 MG/DL (ref 8.6–10.5)
CHLORIDE SERPL-SCNC: 99 MMOL/L (ref 98–107)
CHROMATIN AB SERPL-ACNC: 10.2 IU/ML (ref 0–14)
CO2 SERPL-SCNC: 26.3 MMOL/L (ref 22–29)
CREAT SERPL-MCNC: 0.83 MG/DL (ref 0.57–1)
CRP SERPL-MCNC: 0.15 MG/DL (ref 0–0.5)
DEPRECATED RDW RBC AUTO: 43.2 FL (ref 37–54)
EOSINOPHIL # BLD AUTO: 0.09 10*3/MM3 (ref 0–0.4)
EOSINOPHIL NFR BLD AUTO: 1.6 % (ref 0.3–6.2)
ERYTHROCYTE [DISTWIDTH] IN BLOOD BY AUTOMATED COUNT: 13.6 % (ref 12.3–15.4)
ERYTHROCYTE [SEDIMENTATION RATE] IN BLOOD: 4 MM/HR (ref 0–20)
GFR SERPL CREATININE-BSD FRML MDRD: 73 ML/MIN/1.73
GLOBULIN UR ELPH-MCNC: 3 GM/DL
GLUCOSE SERPL-MCNC: 93 MG/DL (ref 65–99)
HCT VFR BLD AUTO: 42.3 % (ref 34–46.6)
HGB BLD-MCNC: 14.4 G/DL (ref 12–15.9)
IMM GRANULOCYTES # BLD AUTO: 0.02 10*3/MM3 (ref 0–0.05)
IMM GRANULOCYTES NFR BLD AUTO: 0.3 % (ref 0–0.5)
LYMPHOCYTES # BLD AUTO: 1.35 10*3/MM3 (ref 0.7–3.1)
LYMPHOCYTES NFR BLD AUTO: 23.5 % (ref 19.6–45.3)
MCH RBC QN AUTO: 29.7 PG (ref 26.6–33)
MCHC RBC AUTO-ENTMCNC: 34 G/DL (ref 31.5–35.7)
MCV RBC AUTO: 87.2 FL (ref 79–97)
MONOCYTES # BLD AUTO: 0.61 10*3/MM3 (ref 0.1–0.9)
MONOCYTES NFR BLD AUTO: 10.6 % (ref 5–12)
NEUTROPHILS NFR BLD AUTO: 3.63 10*3/MM3 (ref 1.7–7)
NEUTROPHILS NFR BLD AUTO: 63.1 % (ref 42.7–76)
NRBC BLD AUTO-RTO: 0 /100 WBC (ref 0–0.2)
PLATELET # BLD AUTO: 255 10*3/MM3 (ref 140–450)
PMV BLD AUTO: 10.6 FL (ref 6–12)
POTASSIUM SERPL-SCNC: 4.1 MMOL/L (ref 3.5–5.2)
PROT SERPL-MCNC: 7.8 G/DL (ref 6–8.5)
RBC # BLD AUTO: 4.85 10*6/MM3 (ref 3.77–5.28)
SODIUM SERPL-SCNC: 137 MMOL/L (ref 136–145)
WBC # BLD AUTO: 5.75 10*3/MM3 (ref 3.4–10.8)

## 2020-08-27 ENCOUNTER — TREATMENT (OUTPATIENT)
Dept: PHYSICAL THERAPY | Facility: CLINIC | Age: 50
End: 2020-08-27

## 2020-08-27 DIAGNOSIS — M76.32 IT BAND SYNDROME, LEFT: Primary | ICD-10-CM

## 2020-08-27 LAB — CCP IGA+IGG SERPL IA-ACNC: 6 UNITS (ref 0–19)

## 2020-08-27 PROCEDURE — 97110 THERAPEUTIC EXERCISES: CPT | Performed by: PHYSICAL THERAPIST

## 2020-08-27 NOTE — PROGRESS NOTES
Physical Therapy Daily Progress Note    VISIT#: 2    Subjective   Pt reports: Pt reports dx of fibromyalgia and now reports ~1 week history of LBP and cspine pain after prolonged standing. Limited HEP participation to this point.       Objective ~130 deg knee flexion    See Exercise, Manual, and Modality Logs for complete treatment.     Patient Education: HEP, pain mechanism and behaviors    Assessment/Plan Knee ROM improving. No increase in pain with rx. Pt again voices understanding of graded exposure to activity.      Progress per Plan of Care            Timed:         Manual Therapy:         mins  33626;     Therapeutic Exercise:    45     mins  32610;     Neuromuscular Arcelia:        mins  48633;    Therapeutic Activity:          mins  41027;     Gait Training:           mins  88825;     Ultrasound:          mins  40670;    Ionto                                   mins   70537  Self Care                            mins   58714    Un-Timed:  Electrical Stimulation:         mins  85083 ( );  Traction          mins 25479  Low Eval          Mins  41076  Mod Eval          Mins  34212  High Eval                            Mins  90804  Re-Eval                               mins  59434    Timed Treatment:   45   mins   Total Treatment:     45   mins    Britton Brooks, PT, DPT, OCS  [place] license: [id number]  Physical Therapist

## 2020-08-31 ENCOUNTER — TREATMENT (OUTPATIENT)
Dept: PHYSICAL THERAPY | Facility: CLINIC | Age: 50
End: 2020-08-31

## 2020-08-31 DIAGNOSIS — M76.32 IT BAND SYNDROME, LEFT: Primary | ICD-10-CM

## 2020-08-31 DIAGNOSIS — M79.7 FIBROMYALGIA: ICD-10-CM

## 2020-08-31 PROCEDURE — G0283 ELEC STIM OTHER THAN WOUND: HCPCS | Performed by: PHYSICAL THERAPIST

## 2020-08-31 PROCEDURE — 97140 MANUAL THERAPY 1/> REGIONS: CPT | Performed by: PHYSICAL THERAPIST

## 2020-08-31 PROCEDURE — 97110 THERAPEUTIC EXERCISES: CPT | Performed by: PHYSICAL THERAPIST

## 2020-09-03 ENCOUNTER — TREATMENT (OUTPATIENT)
Dept: PHYSICAL THERAPY | Facility: CLINIC | Age: 50
End: 2020-09-03

## 2020-09-03 DIAGNOSIS — M79.7 FIBROMYALGIA: ICD-10-CM

## 2020-09-03 DIAGNOSIS — M76.32 IT BAND SYNDROME, LEFT: Primary | ICD-10-CM

## 2020-09-03 PROCEDURE — 97140 MANUAL THERAPY 1/> REGIONS: CPT | Performed by: PHYSICAL THERAPIST

## 2020-09-03 PROCEDURE — G0283 ELEC STIM OTHER THAN WOUND: HCPCS | Performed by: PHYSICAL THERAPIST

## 2020-09-03 PROCEDURE — 97110 THERAPEUTIC EXERCISES: CPT | Performed by: PHYSICAL THERAPIST

## 2020-09-03 NOTE — PROGRESS NOTES
Physical Therapy Daily Progress Note  Visit: 4          Patient: Nessa Esqueda   : 1970  Diagnosis/ICD-10 Code:  It band syndrome, left [M76.32]  Referring practitioner: Jalen Taylor MD  Date of Initial Visit: Type: THERAPY  Noted: 2020  Today's Date: 9/3/2020      Subjective     Nessa Esqueda reports: tightness and pain today and states she has been under more stress the past few days. However states she felt better than she has in a long time following last visit and was able to walk 2.5 miles without symptoms.     Objective   See Exercise, Manual, and Modality Logs for complete treatment.       Assessment/Plan  Reviewed previous issues stretches with pt demo good carry over. Focus on stretching and manual intervention to address soft tissue restrictions and pain at this time.        Timed:         Manual Therapy:    25     mins  34568;     Therapeutic Exercise:    20     mins  34866;           Un-Timed:  Electrical Stimulation:    15     mins  39476 ( );        Timed Treatment:   45   mins   Total Treatment:     60   mins  Patricia Davidson PT  Physical Therapist

## 2020-09-08 ENCOUNTER — OFFICE VISIT (OUTPATIENT)
Dept: PSYCHIATRY | Facility: CLINIC | Age: 50
End: 2020-09-08

## 2020-09-08 DIAGNOSIS — F43.12 CHRONIC POST-TRAUMATIC STRESS DISORDER (PTSD): ICD-10-CM

## 2020-09-08 DIAGNOSIS — F51.05 INSOMNIA DUE TO OTHER MENTAL DISORDER: ICD-10-CM

## 2020-09-08 DIAGNOSIS — F90.2 ATTENTION DEFICIT HYPERACTIVITY DISORDER (ADHD), COMBINED TYPE: Primary | ICD-10-CM

## 2020-09-08 DIAGNOSIS — F99 INSOMNIA DUE TO OTHER MENTAL DISORDER: ICD-10-CM

## 2020-09-08 PROCEDURE — 99442 PR PHYS/QHP TELEPHONE EVALUATION 11-20 MIN: CPT | Performed by: PHYSICIAN ASSISTANT

## 2020-09-08 RX ORDER — DEXMETHYLPHENIDATE HYDROCHLORIDE 10 MG/1
TABLET ORAL
Qty: 90 TABLET | Refills: 0 | Status: SHIPPED | OUTPATIENT
Start: 2020-09-08 | End: 2020-12-09 | Stop reason: SDUPTHER
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
## 2020-09-09 ENCOUNTER — TREATMENT (OUTPATIENT)
Dept: PHYSICAL THERAPY | Facility: CLINIC | Age: 50
End: 2020-09-09

## 2020-09-09 DIAGNOSIS — M76.32 IT BAND SYNDROME, LEFT: Primary | ICD-10-CM

## 2020-09-09 DIAGNOSIS — M79.7 FIBROMYALGIA: ICD-10-CM

## 2020-09-09 PROCEDURE — 97110 THERAPEUTIC EXERCISES: CPT | Performed by: PHYSICAL THERAPIST

## 2020-09-09 PROCEDURE — G0283 ELEC STIM OTHER THAN WOUND: HCPCS | Performed by: PHYSICAL THERAPIST

## 2020-09-09 PROCEDURE — 97140 MANUAL THERAPY 1/> REGIONS: CPT | Performed by: PHYSICAL THERAPIST

## 2020-09-09 NOTE — PROGRESS NOTES
Physical Therapy Daily Progress Note  Visit: 5          Patient: Nessa Esqueda   : 1970  Diagnosis/ICD-10 Code:  It band syndrome, left [M76.32]  Referring practitioner: Jalen Taylor MD  Date of Initial Visit: Type: THERAPY  Noted: 2020  Today's Date: 2020      Subjective     Nessa Esqueda reports: tightness and pain in neck more than anywhere else today, but whole body still hurts.    Objective   See Exercise, Manual, and Modality Logs for complete treatment.       Assessment/Plan    Pt cont with palpable trigger points throughout cervical and lumbar spine. Focused on STM today and gentle stretching along with modalities for pain management.        Timed:         Manual Therapy:    30     mins  13539;     Therapeutic Exercise:    15     mins  50188;       Un-Timed:  Electrical Stimulation:    15     mins  41636 ( );      Timed Treatment:   45   mins   Total Treatment:     60   mins  Patricia Davidson PT  Physical Therapist

## 2020-09-14 ENCOUNTER — TREATMENT (OUTPATIENT)
Dept: PHYSICAL THERAPY | Facility: CLINIC | Age: 50
End: 2020-09-14

## 2020-09-14 DIAGNOSIS — M79.7 FIBROMYALGIA: ICD-10-CM

## 2020-09-14 DIAGNOSIS — M76.32 IT BAND SYNDROME, LEFT: Primary | ICD-10-CM

## 2020-09-14 PROCEDURE — 97110 THERAPEUTIC EXERCISES: CPT | Performed by: PHYSICAL THERAPIST

## 2020-09-14 PROCEDURE — 97140 MANUAL THERAPY 1/> REGIONS: CPT | Performed by: PHYSICAL THERAPIST

## 2020-09-14 NOTE — PROGRESS NOTES
Physical Therapy Daily Progress Note  Visit: 6          Patient: Nessa Esqueda   : 1970  Diagnosis/ICD-10 Code:  It band syndrome, left [M76.32]  Referring practitioner: Jalen Taylor MD  Date of Initial Visit: Type: THERAPY  Noted: 2020  Today's Date: 2020      Subjective     Nessa Esqueda reports: stress has been high and notices her pain level has been worse since the stress elevated.     Objective   See Exercise, Manual, and Modality Logs for complete treatment.       Assessment/Plan  Held modalities due to time constraint today. Focused on manual intervention for STM and joint mobility and gentle stretching. Pt reported decreased pain following session, but feels her whole body is tight. Pt advised on cont to stretch and possibly get a massage.          Timed:         Manual Therapy:    25     mins  62838;     Therapeutic Exercise:    15     mins  43466;         Timed Treatment:   40   mins   Total Treatment:     40   mins  Patricia Davidson PT  Physical Therapist

## 2020-09-16 ENCOUNTER — TELEPHONE (OUTPATIENT)
Dept: FAMILY MEDICINE CLINIC | Facility: CLINIC | Age: 50
End: 2020-09-16

## 2020-09-16 ENCOUNTER — TREATMENT (OUTPATIENT)
Dept: PHYSICAL THERAPY | Facility: CLINIC | Age: 50
End: 2020-09-16

## 2020-09-16 DIAGNOSIS — M76.32 IT BAND SYNDROME, LEFT: ICD-10-CM

## 2020-09-16 DIAGNOSIS — M79.7 FIBROMYALGIA: Primary | ICD-10-CM

## 2020-09-16 PROCEDURE — G0283 ELEC STIM OTHER THAN WOUND: HCPCS | Performed by: PHYSICAL THERAPIST

## 2020-09-16 PROCEDURE — 97140 MANUAL THERAPY 1/> REGIONS: CPT | Performed by: PHYSICAL THERAPIST

## 2020-09-16 NOTE — TELEPHONE ENCOUNTER
PATIENT CALLED AND STATES HER PHYSICAL THERAPIST JB MCCORMACK SUGGESTED SHE GETS A REFERRAL FOR PAIN MANAGEMENT. SHE HAS PUT NOTES IN HER CHART.   PLEASE CALL PATIENT [phone]

## 2020-09-16 NOTE — TELEPHONE ENCOUNTER
Pain mgmt will not have much to offer for fibromyalgia. I recommend she return for follow-up and we can discuss possible referral

## 2020-09-18 ENCOUNTER — TELEPHONE (OUTPATIENT)
Dept: FAMILY MEDICINE CLINIC | Facility: CLINIC | Age: 50
End: 2020-09-18

## 2020-09-18 DIAGNOSIS — G89.29 OTHER CHRONIC PAIN: Primary | ICD-10-CM

## 2020-09-18 NOTE — TELEPHONE ENCOUNTER
PT CALLED STATING SHE NEEDS A REFERRAL TO PAIN MANAGEMENT. PT STATES SHE DISAGREES WITH DR RODRIGUEZ THAT PAIN MANAGEMENT CAN HELP HER ISSUES. PT SPOKE WITH PAIN MANAGEMENT AND THEY ALSO STATED THEY CAN HELP HER     PT WANTS THIS REFERRAL SENT ASAP     PLEASE ADVISE     PT CALL BACK  [phone]

## 2020-09-21 ENCOUNTER — TREATMENT (OUTPATIENT)
Dept: PHYSICAL THERAPY | Facility: CLINIC | Age: 50
End: 2020-09-21

## 2020-09-21 DIAGNOSIS — M76.32 IT BAND SYNDROME, LEFT: ICD-10-CM

## 2020-09-21 DIAGNOSIS — M79.7 FIBROMYALGIA: Primary | ICD-10-CM

## 2020-09-21 PROCEDURE — G0283 ELEC STIM OTHER THAN WOUND: HCPCS | Performed by: PHYSICAL THERAPIST

## 2020-09-21 PROCEDURE — 97110 THERAPEUTIC EXERCISES: CPT | Performed by: PHYSICAL THERAPIST

## 2020-09-21 PROCEDURE — 97140 MANUAL THERAPY 1/> REGIONS: CPT | Performed by: PHYSICAL THERAPIST

## 2020-09-21 NOTE — TELEPHONE ENCOUNTER
Tried calling patient and got voice mail.  Left a voice message on patient's voice mail that Dr Irvin is leaving our practice on 12/11/2020 and is not accepting any new patients.  I did say that [facility] did send the referral to pain management on Friday.

## 2020-09-21 NOTE — TELEPHONE ENCOUNTER
PATIENT SEE DR. RODRIGUEZ AND  IS CALLING WANTING TO SCHEDULE A NEW PATIENT VISIT WITH DR. BARRAGAN TO TALK ABOUT A REFERRAL FOR FIBROMYALGIA    PATIENT WOULD LIKE TO SEE A FEMALE DOCTOR    PATIENT WOULD LIKE TO KNOW IF SHE COULD GET A SOONER APPOINTMENT  BY DR. BARRAGAN    PLEASE CONTACT PATIENT @[phone]

## 2020-09-24 ENCOUNTER — TREATMENT (OUTPATIENT)
Dept: PHYSICAL THERAPY | Facility: CLINIC | Age: 50
End: 2020-09-24

## 2020-09-24 DIAGNOSIS — M79.7 FIBROMYALGIA: Primary | ICD-10-CM

## 2020-09-24 DIAGNOSIS — M76.32 IT BAND SYNDROME, LEFT: ICD-10-CM

## 2020-09-24 PROCEDURE — G0283 ELEC STIM OTHER THAN WOUND: HCPCS | Performed by: PHYSICAL THERAPIST

## 2020-09-24 PROCEDURE — 97110 THERAPEUTIC EXERCISES: CPT | Performed by: PHYSICAL THERAPIST

## 2020-09-24 PROCEDURE — 97140 MANUAL THERAPY 1/> REGIONS: CPT | Performed by: PHYSICAL THERAPIST
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
## 2020-09-24 NOTE — PROGRESS NOTES
Physical Therapy Daily Progress Note  Visit: 9          Patient: Nessa Esqueda   : 1970  Diagnosis/ICD-10 Code:  Fibromyalgia [M79.7]  Referring practitioner: Jalen Taylor MD  Date of Initial Visit: Type: THERAPY  Noted: 2020  Today's Date: 2020      Subjective     Nessa Esqueda reports: cont widespread pain, but less intense today. States she has received a referral to pain management.     Objective   See Exercise, Manual, and Modality Logs for complete treatment.       Assessment/Plan    Manual intervention and stretching cont which pt demo improvement in symptoms and mm tension following intervention. Pt is independent with stretches at this time. Planning to start strengthening and stabilization exercises as tolerated next visit.            Timed:         Manual Therapy:    30     mins  01698;     Therapeutic Exercise:    10     mins  89909;       Un-Timed:  Electrical Stimulation:    15     mins  09134 ( );      Timed Treatment:   40   mins   Total Treatment:     55   mins  Patricia Davidson PT  Physical Therapist

## 2020-09-25 ENCOUNTER — OFFICE VISIT (OUTPATIENT)
Dept: PAIN MEDICINE | Facility: CLINIC | Age: 50
End: 2020-09-25

## 2020-09-25 ENCOUNTER — RESULTS ENCOUNTER (OUTPATIENT)
Dept: PAIN MEDICINE | Facility: CLINIC | Age: 50
End: 2020-09-25

## 2020-09-25 VITALS
HEART RATE: 104 BPM | BODY MASS INDEX: 23 KG/M2 | OXYGEN SATURATION: 100 % | HEIGHT: 62 IN | TEMPERATURE: 99.5 F | WEIGHT: 125 LBS | DIASTOLIC BLOOD PRESSURE: 84 MMHG | RESPIRATION RATE: 16 BRPM | SYSTOLIC BLOOD PRESSURE: 137 MMHG

## 2020-09-25 DIAGNOSIS — M79.7 FIBROMYALGIA: ICD-10-CM

## 2020-09-25 DIAGNOSIS — F19.90 CURRENT DRUG USE: ICD-10-CM

## 2020-09-25 DIAGNOSIS — F41.9 ANXIETY: ICD-10-CM

## 2020-09-25 DIAGNOSIS — R52 GENERALIZED PAIN: ICD-10-CM

## 2020-09-25 DIAGNOSIS — F19.90 CURRENT DRUG USE: Primary | ICD-10-CM

## 2020-09-25 DIAGNOSIS — M76.32 IT BAND SYNDROME, LEFT: ICD-10-CM

## 2020-09-25 DIAGNOSIS — F43.10 PTSD (POST-TRAUMATIC STRESS DISORDER): ICD-10-CM

## 2020-09-25 PROCEDURE — G0463 HOSPITAL OUTPT CLINIC VISIT: HCPCS | Performed by: STUDENT IN AN ORGANIZED HEALTH CARE EDUCATION/TRAINING PROGRAM

## 2020-09-25 PROCEDURE — 99204 OFFICE O/P NEW MOD 45 MIN: CPT | Performed by: STUDENT IN AN ORGANIZED HEALTH CARE EDUCATION/TRAINING PROGRAM

## 2020-09-25 RX ORDER — METHOCARBAMOL 500 MG/1
500 TABLET, FILM COATED ORAL NIGHTLY PRN
Qty: 30 TABLET | Refills: 1 | Status: SHIPPED | OUTPATIENT
Start: 2020-09-25 | End: 2020-11-20 | Stop reason: SDUPTHER

## 2020-09-25 RX ORDER — PREGABALIN 25 MG/1
25 CAPSULE ORAL 2 TIMES DAILY
Qty: 60 CAPSULE | Refills: 1 | Status: SHIPPED | OUTPATIENT
Start: 2020-09-25 | End: 2020-11-20

## 2020-09-28 ENCOUNTER — TREATMENT (OUTPATIENT)
Dept: PHYSICAL THERAPY | Facility: CLINIC | Age: 50
End: 2020-09-28

## 2020-09-28 DIAGNOSIS — M76.32 IT BAND SYNDROME, LEFT: ICD-10-CM

## 2020-09-28 DIAGNOSIS — M79.7 FIBROMYALGIA: Primary | ICD-10-CM

## 2020-09-28 DIAGNOSIS — E53.8 B12 DEFICIENCY: Primary | ICD-10-CM

## 2020-09-28 PROCEDURE — 97110 THERAPEUTIC EXERCISES: CPT | Performed by: PHYSICAL THERAPIST

## 2020-09-28 PROCEDURE — 97140 MANUAL THERAPY 1/> REGIONS: CPT | Performed by: PHYSICAL THERAPIST

## 2020-09-28 PROCEDURE — G0283 ELEC STIM OTHER THAN WOUND: HCPCS | Performed by: PHYSICAL THERAPIST

## 2020-09-28 RX ORDER — CYANOCOBALAMIN 1000 UG/ML
1000 INJECTION, SOLUTION INTRAMUSCULAR; SUBCUTANEOUS
Status: COMPLETED | OUTPATIENT
Start: 2020-09-28 | End: 2020-11-04

## 2020-09-28 NOTE — PROGRESS NOTES
Physical Therapy Daily Progress Note  Visit: 10          Patient: Nessa Esqueda   : 1970  Diagnosis/ICD-10 Code:  Fibromyalgia [M79.7]  Referring practitioner: Jalen Taylor MD  Date of Initial Visit: Type: THERAPY  Noted: 2020  Today's Date: 2020      Subjective     Nessa Esqueda reports: feeling a little better today and has been focusing on finding ways to manage stress.     Objective   See Exercise, Manual, and Modality Logs for complete treatment.       Assessment/Plan    Initiated gentle core and scap strengthening today. Pt reports pain with exercise, but was able to complete 1 set of each issued exercise. Cont manual intervention for pain relief and STM. No adverse reaction to tx session.         Timed:         Manual Therapy:    25     mins  25153;     Therapeutic Exercise:    20     mins  94450;           Un-Timed:  Electrical Stimulation:    15     mins  97819 ( );        Timed Treatment:   45   mins   Total Treatment:     60   mins  Patricia Davidson PT  Physical Therapist

## 2020-09-29 ENCOUNTER — TELEPHONE (OUTPATIENT)
Dept: FAMILY MEDICINE CLINIC | Facility: CLINIC | Age: 50
End: 2020-09-29

## 2020-09-29 DIAGNOSIS — E53.8 VITAMIN B 12 DEFICIENCY: Primary | ICD-10-CM

## 2020-09-29 DIAGNOSIS — F43.12 CHRONIC POST-TRAUMATIC STRESS DISORDER (PTSD): ICD-10-CM

## 2020-09-29 RX ORDER — DIAZEPAM 5 MG/1
5 TABLET ORAL NIGHTLY PRN
Qty: 30 TABLET | Refills: 2 | Status: SHIPPED | OUTPATIENT
Start: 2020-09-29 | End: 2021-01-05 | Stop reason: SDUPTHER

## 2020-09-29 NOTE — TELEPHONE ENCOUNTER
Patient calling she dropped of lab work regarding her B12 results on 9/25 and was told she would get a call back and hasnt heard anything. Would like a call back at [phone] so she can see what she needs to do with getting her numbers up.

## 2020-09-29 NOTE — TELEPHONE ENCOUNTER
Spoke with pt. She will come in for weekly for 6wk then monthly for her B-12 inj. Repeat labs in 6-8 weeks to make sure b-12 level is good. Order in.

## 2020-09-29 NOTE — TELEPHONE ENCOUNTER
She can look up a low FODMAP diet to see if there are foods that exacerbate her issues. If having continued issues, she should make an appt

## 2020-09-29 NOTE — TELEPHONE ENCOUNTER
Pt concerned that she has IBS and she is sure that is is coming from her fibromyalgia. Wants to know if there is anything that you recommend she can take She said symptoms started around the time her fibromyalgia was dx.please advise

## 2020-09-30 ENCOUNTER — TREATMENT (OUTPATIENT)
Dept: PHYSICAL THERAPY | Facility: CLINIC | Age: 50
End: 2020-09-30

## 2020-09-30 ENCOUNTER — TELEPHONE (OUTPATIENT)
Dept: PAIN MEDICINE | Facility: HOSPITAL | Age: 50
End: 2020-09-30

## 2020-09-30 DIAGNOSIS — E53.8 VITAMIN B 12 DEFICIENCY: Primary | ICD-10-CM

## 2020-09-30 DIAGNOSIS — M76.32 IT BAND SYNDROME, LEFT: ICD-10-CM

## 2020-09-30 DIAGNOSIS — M79.7 FIBROMYALGIA: Primary | ICD-10-CM

## 2020-09-30 PROCEDURE — 97140 MANUAL THERAPY 1/> REGIONS: CPT | Performed by: PHYSICAL THERAPIST

## 2020-09-30 PROCEDURE — 96372 THER/PROPH/DIAG INJ SC/IM: CPT | Performed by: FAMILY MEDICINE

## 2020-09-30 PROCEDURE — G0283 ELEC STIM OTHER THAN WOUND: HCPCS | Performed by: PHYSICAL THERAPIST

## 2020-09-30 PROCEDURE — 97110 THERAPEUTIC EXERCISES: CPT | Performed by: PHYSICAL THERAPIST

## 2020-09-30 RX ADMIN — CYANOCOBALAMIN 1000 MCG: 1000 INJECTION, SOLUTION INTRAMUSCULAR; SUBCUTANEOUS at 13:19

## 2020-09-30 NOTE — PROGRESS NOTES
Re-Assessment / Re-Certification      Patient: Nessa Esqueda   : 1970  Diagnosis/ICD-10 Code:  Fibromyalgia [M79.7]  Referring practitioner: Jalen Taylor MD  Date of Initial Visit: Type: THERAPY  Noted: 2020  Today's Date: 2020  Patient seen for 11 sessions      Subjective:   Nessa Esqueda reports: states her pain is usually dependent on her stress level at home. States she will do good for a while and then something will happen to stress her out and her pain will return. Remains with widespread body pain at this time.   Subjective Questionnaire: Oswestry: 50%   Clinical Progress: unchanged  Home Program Compliance: reports yes  Treatment has included: therapeutic exercise, manual therapy, electrical stimulation and moist heat    Subjective Evaluation    Pain  Current pain rating: 10  At best pain ratin  At worst pain rating: 10         Objective          Palpation   Left   Tenderness of the erector spinae, cervical interspinals, cervical paraspinals, latissimus, levator scapulae, lumbar paraspinals, suboccipitals and upper trapezius.     Right Tenderness of the erector spinae, cervical interspinals, cervical paraspinals, latissimus, levator scapulae, lumbar paraspinals, suboccipitals and upper trapezius.     Strength/Myotome Testing     Left Shoulder     Planes of Motion   Flexion: 4   Abduction: 4   External rotation at 0°: 4-   Internal rotation at 0°: 4     Isolated Muscles   Lower trapezius: 3+   Middle trapezius: 3+   Serratus anterior: 3+     Right Shoulder     Planes of Motion   Flexion: 4   Abduction: 4   External rotation at 0°: 4-   Internal rotation at 0°: 4     Isolated Muscles   Lower trapezius: 3+   Middle trapezius: 3+   Serratus anterior: 3+     Left Hip   Planes of Motion   Flexion: 4-  Extension: 4-  Abduction: 4-  Adduction: 4-    Right Hip   Planes of Motion   Flexion: 4  Extension: 4  Abduction: 4  Adduction: 4    Left Knee   Flexion: 4  Extension: 4-    Right Knee    Flexion: 4  Extension: 4    Additional Strength Details  Lower abdominals 3+/5    Lumbar Flexibility Comments:   Mod tightness in paraspinals, HS, piriformis, psoas, ITB noted B    Cervical Flexibility Comments:   UT, levator, and pec tightness noted B- mod      Assessment & Plan     Assessment  Impairments: abnormal or restricted ROM, activity intolerance, impaired physical strength, lacks appropriate home exercise program and pain with function  Assessment details: Pt has attended 11 visits in skilled PT. She denies significant long term change in symptoms at this time. States her pain tends to be affected by her stress level at home. Reports improvement temporarily after PT sessions, but when she becomes stressed, her pain returns. Pt has been referred to pain management for evaulation and pain management techniques. She remains with weakness throughout UE and LE at this time along with tightness in cervical spine and B LE mm. She may benefit from gentle stretching and strengthening program in conjunction with modalities to address chronic pain and improve quality of life.  Prognosis: fair  Functional Limitations: lifting, walking, pulling, pushing, uncomfortable because of pain and standing  Goals  Plan Goals: Short term goals, 2 weeks: Tolerate HEP progression. PROGRESSING  Voice compliance with activity modification. PROGRESSING  Report improvement in symptoms. NOT MET    LTGs, 8 weeks: Improve LEFS to 60%.  Knee AROM to be 0-120 deg. MET  4+/5 LE strength throughout. PROGRESSING  Independent with HEP. PROGRESSING    Added LTG 8 weeks:  4+/5 scap and UE strength throughout. PROGRESSING  4/5 lower abdominal strength PROGRESSING  Mild flexibility restrictions UE and LE PROGRESSING  Decreased pain reports by 50% NOT MET      Plan  Therapy options: will be seen for skilled physical therapy services  Planned modality interventions: cryotherapy, electrical stimulation/Russian stimulation and thermotherapy  (hydrocollator packs)  Other planned modality interventions: modalities prn  Planned therapy interventions: balance/weight-bearing training, flexibility, functional ROM exercises, home exercise program, manual therapy, neuromuscular re-education, postural training, strengthening, stretching, therapeutic activities, abdominal trunk stabilization, soft tissue mobilization and spinal/joint mobilization  Frequency: 2x week (1-2 times per week)  Duration in weeks: 8  Treatment plan discussed with: patient      Progress toward previous goals: Partially Met    Recommendations: Continue as planned      PT Signature: Patricia Davidson PT      Based upon review of the patient's progress and continued therapy plan, it is my medical opinion that Nessa Esqueda should continue physical therapy treatment at [facility]  [address] [place] [id number] [phone].    Signature: __________________________________  Jalen Taylor MD    Timed:         Manual Therapy:    15     mins  80748;     Therapeutic Exercise:    25     mins  87072;         Un-Timed:  Electrical Stimulation:    15     mins  86791 ( );      Timed Treatment:   40   mins   Total Treatment:     60   mins

## 2020-10-01 ENCOUNTER — TELEPHONE (OUTPATIENT)
Dept: PAIN MEDICINE | Facility: HOSPITAL | Age: 50
End: 2020-10-01

## 2020-10-01 NOTE — TELEPHONE ENCOUNTER
Insurance denied her Pregabalin, I have filed an appeal which could take up to 30 days to find out if they will approve it. Do you have anything else you could offer her at this time? She did want you to know she can not use Cymbalta if that was going to be an option.

## 2020-10-05 ENCOUNTER — TREATMENT (OUTPATIENT)
Dept: PHYSICAL THERAPY | Facility: CLINIC | Age: 50
End: 2020-10-05

## 2020-10-05 DIAGNOSIS — M76.32 IT BAND SYNDROME, LEFT: ICD-10-CM

## 2020-10-05 DIAGNOSIS — M79.7 FIBROMYALGIA: Primary | ICD-10-CM

## 2020-10-05 PROCEDURE — 97110 THERAPEUTIC EXERCISES: CPT | Performed by: PHYSICAL THERAPIST

## 2020-10-05 PROCEDURE — 97140 MANUAL THERAPY 1/> REGIONS: CPT | Performed by: PHYSICAL THERAPIST

## 2020-10-05 PROCEDURE — G0283 ELEC STIM OTHER THAN WOUND: HCPCS | Performed by: PHYSICAL THERAPIST

## 2020-10-05 NOTE — PROGRESS NOTES
Physical Therapy Daily Progress Note  Visit: 12          Patient: Nessa Esqueda   : 1970  Diagnosis/ICD-10 Code:  Fibromyalgia [M79.7]  Referring practitioner: Jalen Taylor MD  Date of Initial Visit: Type: THERAPY  Noted: 2020  Today's Date: 10/5/2020      Subjective     Nessa Esqueda reports: increased stress due to an upcoming wedding in her family which has caused her pain to increase as well. States she has been more sore following exercise the last two visits.     Objective   See Exercise, Manual, and Modality Logs for complete treatment.       Assessment/Plan    Pt cont to be limited in PT due to psychosocial issues and high pain levels. Unable to perform strengthening exercises due to pain levels, thereofre focused on stretching and STM today.        Timed:         Manual Therapy:    35     mins  81254;     Therapeutic Exercise:    10     mins  13189;         Un-Timed:  Electrical Stimulation:    15     mins  77889 ( );      Timed Treatment:   45   mins   Total Treatment:     60   mins  Patricia Davidson PT  Physical Therapist

## 2020-10-07 ENCOUNTER — CLINICAL SUPPORT (OUTPATIENT)
Dept: FAMILY MEDICINE CLINIC | Facility: CLINIC | Age: 50
End: 2020-10-07

## 2020-10-07 ENCOUNTER — TREATMENT (OUTPATIENT)
Dept: PHYSICAL THERAPY | Facility: CLINIC | Age: 50
End: 2020-10-07

## 2020-10-07 DIAGNOSIS — M76.32 IT BAND SYNDROME, LEFT: ICD-10-CM

## 2020-10-07 DIAGNOSIS — E53.8 VITAMIN B 12 DEFICIENCY: ICD-10-CM

## 2020-10-07 DIAGNOSIS — M79.7 FIBROMYALGIA: Primary | ICD-10-CM

## 2020-10-07 PROCEDURE — G0283 ELEC STIM OTHER THAN WOUND: HCPCS | Performed by: PHYSICAL THERAPIST

## 2020-10-07 PROCEDURE — 96372 THER/PROPH/DIAG INJ SC/IM: CPT | Performed by: FAMILY MEDICINE

## 2020-10-07 PROCEDURE — 97140 MANUAL THERAPY 1/> REGIONS: CPT | Performed by: PHYSICAL THERAPIST

## 2020-10-07 RX ADMIN — CYANOCOBALAMIN 1000 MCG: 1000 INJECTION, SOLUTION INTRAMUSCULAR; SUBCUTANEOUS at 14:48
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
## 2020-10-12 ENCOUNTER — TREATMENT (OUTPATIENT)
Dept: PHYSICAL THERAPY | Facility: CLINIC | Age: 50
End: 2020-10-12

## 2020-10-12 DIAGNOSIS — M76.32 IT BAND SYNDROME, LEFT: ICD-10-CM

## 2020-10-12 DIAGNOSIS — M79.7 FIBROMYALGIA: Primary | ICD-10-CM

## 2020-10-12 PROCEDURE — 97140 MANUAL THERAPY 1/> REGIONS: CPT | Performed by: PHYSICAL THERAPIST

## 2020-10-12 PROCEDURE — G0283 ELEC STIM OTHER THAN WOUND: HCPCS | Performed by: PHYSICAL THERAPIST

## 2020-10-12 NOTE — PROGRESS NOTES
Physical Therapy Daily Progress Note  Visit: 14          Patient: Nessa Esqueda   : 1970  Diagnosis/ICD-10 Code:  Fibromyalgia [M79.7]  Referring practitioner: Jalen Taylor MD  Date of Initial Visit: Type: THERAPY  Noted: 2020  Today's Date: 10/12/2020      Subjective     Nessa Esqueda reports: stress from a wedding over the weekend so her pain level is elevated. However states she did get a TENS unit for home which helps while she is using it.     Objective   See Exercise, Manual, and Modality Logs for complete treatment.       Assessment/Plan    Remains with TTP throughout spine. Trigger points noted in B UT, LS, and paraspinal mm. Pt has not tolerated strengthening exercises, therefore focusing on stretching and manual intervention. Pt cont to be limited by pain and psychosocial effects.         Timed:  Manual Therapy:    38     mins  11521;      Untimed:  Electrical Stimulation:    15     mins  01060 ( );    Timed Treatment:   38   mins   Total Treatment:     53   mins  Patricia Davidson PT  Physical Therapist

## 2020-10-15 ENCOUNTER — OFFICE VISIT (OUTPATIENT)
Dept: FAMILY MEDICINE CLINIC | Facility: CLINIC | Age: 50
End: 2020-10-15

## 2020-10-15 ENCOUNTER — RESULTS ENCOUNTER (OUTPATIENT)
Dept: FAMILY MEDICINE CLINIC | Facility: CLINIC | Age: 50
End: 2020-10-15

## 2020-10-15 ENCOUNTER — TREATMENT (OUTPATIENT)
Dept: PHYSICAL THERAPY | Facility: CLINIC | Age: 50
End: 2020-10-15

## 2020-10-15 VITALS
HEIGHT: 62 IN | DIASTOLIC BLOOD PRESSURE: 90 MMHG | HEART RATE: 106 BPM | TEMPERATURE: 96.9 F | WEIGHT: 123 LBS | SYSTOLIC BLOOD PRESSURE: 142 MMHG | BODY MASS INDEX: 22.63 KG/M2 | OXYGEN SATURATION: 99 %

## 2020-10-15 DIAGNOSIS — Z12.11 SCREENING FOR COLON CANCER: ICD-10-CM

## 2020-10-15 DIAGNOSIS — F43.12 CHRONIC POST-TRAUMATIC STRESS DISORDER (PTSD): ICD-10-CM

## 2020-10-15 DIAGNOSIS — Z12.31 ENCOUNTER FOR SCREENING MAMMOGRAM FOR BREAST CANCER: ICD-10-CM

## 2020-10-15 DIAGNOSIS — F90.9 ATTENTION DEFICIT HYPERACTIVITY DISORDER (ADHD), UNSPECIFIED ADHD TYPE: ICD-10-CM

## 2020-10-15 DIAGNOSIS — M79.7 FIBROMYALGIA: Primary | ICD-10-CM

## 2020-10-15 DIAGNOSIS — E53.8 B12 DEFICIENCY: ICD-10-CM

## 2020-10-15 DIAGNOSIS — E03.9 ACQUIRED HYPOTHYROIDISM: ICD-10-CM

## 2020-10-15 DIAGNOSIS — M76.32 IT BAND SYNDROME, LEFT: ICD-10-CM

## 2020-10-15 DIAGNOSIS — E34.9 HORMONE IMBALANCE: ICD-10-CM

## 2020-10-15 PROCEDURE — 97140 MANUAL THERAPY 1/> REGIONS: CPT | Performed by: PHYSICAL THERAPIST

## 2020-10-15 PROCEDURE — 96372 THER/PROPH/DIAG INJ SC/IM: CPT | Performed by: NURSE PRACTITIONER

## 2020-10-15 PROCEDURE — 99214 OFFICE O/P EST MOD 30 MIN: CPT | Performed by: NURSE PRACTITIONER

## 2020-10-15 PROCEDURE — G0283 ELEC STIM OTHER THAN WOUND: HCPCS | Performed by: PHYSICAL THERAPIST

## 2020-10-15 RX ORDER — LEVOTHYROXINE, LIOTHYRONINE 57; 13.5 UG/1; UG/1
90 TABLET ORAL DAILY
Qty: 30 TABLET | Refills: 6 | Status: SHIPPED | OUTPATIENT
Start: 2020-10-15 | End: 2021-07-05

## 2020-10-15 RX ADMIN — CYANOCOBALAMIN 1000 MCG: 1000 INJECTION, SOLUTION INTRAMUSCULAR; SUBCUTANEOUS at 15:00

## 2020-10-15 NOTE — PROGRESS NOTES
Physical Therapy Daily Progress Note  Visit: 15          Patient: Nessa Esqueda   : 1970  Diagnosis/ICD-10 Code:  Fibromyalgia [M79.7]  Referring practitioner: Jalen Taylor MD  Date of Initial Visit: Type: THERAPY  Noted: 2020  Today's Date: 10/15/2020      Subjective Evaluation    History of Present Illness    Subjective comment: Modified Oswestry: 42%, NDI: 40 %Pain  Current pain ratin  At best pain ratin  At worst pain rating: 10  Location: wide spread body pain           Nessa Esqueda reports: Cont wide spread pain today that improved temporarily with use of home TENS unit and following PT.    Objective          Palpation   Left   Tenderness of the erector spinae, cervical interspinals, cervical paraspinals, latissimus, levator scapulae, lumbar paraspinals, suboccipitals and upper trapezius.     Right Tenderness of the erector spinae, cervical interspinals, cervical paraspinals, latissimus, levator scapulae, lumbar paraspinals, suboccipitals and upper trapezius.     Strength/Myotome Testing     Left Shoulder     Planes of Motion   Flexion: 4   Abduction: 4   External rotation at 0°: 4-   Internal rotation at 0°: 4     Isolated Muscles   Lower trapezius: 3+   Middle trapezius: 3+   Serratus anterior: 3+     Right Shoulder     Planes of Motion   Flexion: 4   Abduction: 4   External rotation at 0°: 4-   Internal rotation at 0°: 4     Isolated Muscles   Lower trapezius: 3+   Middle trapezius: 3+   Serratus anterior: 3+     Left Hip   Planes of Motion   Flexion: 4-  Extension: 4-  Abduction: 4-  Adduction: 4-    Right Hip   Planes of Motion   Flexion: 4  Extension: 4  Abduction: 4  Adduction: 4    Left Knee   Flexion: 4  Extension: 4-    Right Knee   Flexion: 4  Extension: 4    Additional Strength Details  Lower abdominals 3+/5    Lumbar Flexibility Comments:   Mod tightness in paraspinals, HS, piriformis, psoas, ITB noted B    Cervical Flexibility Comments:   UT, levator, and pec  tightness noted B- mod      See Exercise, Manual, and Modality Logs for complete treatment.       Assessment & Plan     Assessment  Impairments: abnormal or restricted ROM, activity intolerance, impaired physical strength, lacks appropriate home exercise program and pain with function  Assessment details: Pt has attended 15 visits in skilled PT. She denies significant long term change in symptoms at this time. States her pain tends to be affected by her stress level at home. Reports improvement temporarily after PT sessions, but when she becomes stressed, her pain returns. Pt has been referred to pain management for evaulation and pain management techniques. She was prescribed Lyrica from pain management which she started 2 weeks ago and believes it to be beginning to help. She remains with weakness throughout UE and LE at this time along with tightness in cervical spine and B LE mm. However, she does not tolerate strengthening exercises at this time and only gentle stretching. She may benefit from gentle stretching and strengthening program, if tolerance improves, in conjunction with modalities to address chronic pain and improve quality of life. Recommend continued psychological therapy and pain management.    Prognosis: fair  Functional Limitations: lifting, walking, pulling, pushing, uncomfortable because of pain and standing  Goals  Plan Goals: Short term goals, 2 weeks: Tolerate HEP progression. PROGRESSING  Voice compliance with activity modification. PROGRESSING  Report improvement in symptoms. NOT MET    LTGs, 8 weeks: Improve LEFS to 60%.  Knee AROM to be 0-120 deg. MET  4+/5 LE strength throughout. NOT MET  Independent with HEP. PROGRESSING    Added LTG 8 weeks:  4+/5 scap and UE strength throughout. NOT MET  4/5 lower abdominal strength NOT MET  Mild flexibility restrictions UE and LE PROGRESSING  Decreased pain reports by 50% NOT MET      Plan  Therapy options: will be seen for skilled physical therapy  services  Planned modality interventions: cryotherapy, electrical stimulation/Russian stimulation and thermotherapy (hydrocollator packs)  Other planned modality interventions: modalities prn  Planned therapy interventions: balance/weight-bearing training, flexibility, functional ROM exercises, home exercise program, manual therapy, neuromuscular re-education, postural training, strengthening, stretching, therapeutic activities, abdominal trunk stabilization, soft tissue mobilization and spinal/joint mobilization  Frequency: 2x week (1-2 times per week)  Duration in weeks: 8  Treatment plan discussed with: patient         Timed:  Manual Therapy:    30     mins  35438;  Therapeutic Exercise:    5     mins  15370;       Untimed:  Electrical Stimulation:    15     mins  84767 ( );    Timed Treatment:   35   mins   Total Treatment:     50   mins  Patricia Davidson PT  Physical Therapist
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
## 2020-10-22 ENCOUNTER — CLINICAL SUPPORT (OUTPATIENT)
Dept: FAMILY MEDICINE CLINIC | Facility: CLINIC | Age: 50
End: 2020-10-22

## 2020-10-22 DIAGNOSIS — E53.9 VITAMIN B DEFICIENCY: ICD-10-CM

## 2020-10-22 PROCEDURE — 96372 THER/PROPH/DIAG INJ SC/IM: CPT | Performed by: NURSE PRACTITIONER

## 2020-10-22 RX ADMIN — CYANOCOBALAMIN 1000 MCG: 1000 INJECTION, SOLUTION INTRAMUSCULAR; SUBCUTANEOUS at 09:49

## 2020-10-30 ENCOUNTER — CLINICAL SUPPORT (OUTPATIENT)
Dept: FAMILY MEDICINE CLINIC | Facility: CLINIC | Age: 50
End: 2020-10-30

## 2020-10-30 DIAGNOSIS — E53.8 B12 DEFICIENCY: ICD-10-CM

## 2020-10-30 PROCEDURE — 96372 THER/PROPH/DIAG INJ SC/IM: CPT | Performed by: NURSE PRACTITIONER

## 2020-10-30 RX ADMIN — CYANOCOBALAMIN 1000 MCG: 1000 INJECTION, SOLUTION INTRAMUSCULAR; SUBCUTANEOUS at 09:45

## 2020-11-04 ENCOUNTER — CLINICAL SUPPORT (OUTPATIENT)
Dept: FAMILY MEDICINE CLINIC | Facility: CLINIC | Age: 50
End: 2020-11-04

## 2020-11-04 DIAGNOSIS — E53.8 B12 DEFICIENCY: Primary | ICD-10-CM

## 2020-11-04 PROCEDURE — 96372 THER/PROPH/DIAG INJ SC/IM: CPT | Performed by: NURSE PRACTITIONER

## 2020-11-04 RX ADMIN — CYANOCOBALAMIN 1000 MCG: 1000 INJECTION, SOLUTION INTRAMUSCULAR; SUBCUTANEOUS at 13:56

## 2020-11-19 ENCOUNTER — DOCUMENTATION (OUTPATIENT)
Dept: PHYSICAL THERAPY | Facility: CLINIC | Age: 50
End: 2020-11-19

## 2020-11-19 DIAGNOSIS — M76.32 IT BAND SYNDROME, LEFT: ICD-10-CM

## 2020-11-19 DIAGNOSIS — M79.7 FIBROMYALGIA: Primary | ICD-10-CM
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
## 2020-11-19 NOTE — PROGRESS NOTES
Discharge Summary  Discharge Summary from Physical Therapy Report      Patient: Nessa Esqueda   : 1970  Diagnosis/ICD-10 Code:  Fibromyalgia [M79.7]      Discharge Status of Patient: See Progress Note dated 10/15/2020    Goals: Partially Met    Discharge Plan: Continue with current home exercise program as instructed    Comments Pt has attended 15 visits in skilled PT. She denies significant long term change in symptoms at this time. States her pain tends to be affected by her stress level at home. Reports improvement temporarily after PT sessions, but when she becomes stressed, her pain returns. Pt has been referred to pain management for evaulation and pain management techniques. She was prescribed Lyrica from pain management which she started 2 weeks ago and believes it to be beginning to help. She remains with weakness throughout UE and LE at this time along with tightness in cervical spine and B LE mm. However, she does not tolerate strengthening exercises at this time and only gentle stretching.Pt was denied further visits per insurance. D/c to HEP.       Patricia Davidson, PT  Physical Therapist

## 2020-11-20 ENCOUNTER — OFFICE VISIT (OUTPATIENT)
Dept: PAIN MEDICINE | Facility: CLINIC | Age: 50
End: 2020-11-20

## 2020-11-20 VITALS
BODY MASS INDEX: 23 KG/M2 | SYSTOLIC BLOOD PRESSURE: 139 MMHG | OXYGEN SATURATION: 100 % | DIASTOLIC BLOOD PRESSURE: 85 MMHG | TEMPERATURE: 96.9 F | HEART RATE: 96 BPM | WEIGHT: 125 LBS | HEIGHT: 62 IN | RESPIRATION RATE: 16 BRPM

## 2020-11-20 DIAGNOSIS — M76.32 IT BAND SYNDROME, LEFT: ICD-10-CM

## 2020-11-20 DIAGNOSIS — M79.7 FIBROMYALGIA: Primary | ICD-10-CM

## 2020-11-20 DIAGNOSIS — R52 GENERALIZED PAIN: ICD-10-CM

## 2020-11-20 DIAGNOSIS — F43.10 PTSD (POST-TRAUMATIC STRESS DISORDER): ICD-10-CM

## 2020-11-20 DIAGNOSIS — F41.9 ANXIETY: ICD-10-CM

## 2020-11-20 PROCEDURE — G0463 HOSPITAL OUTPT CLINIC VISIT: HCPCS | Performed by: STUDENT IN AN ORGANIZED HEALTH CARE EDUCATION/TRAINING PROGRAM

## 2020-11-20 PROCEDURE — 99213 OFFICE O/P EST LOW 20 MIN: CPT | Performed by: STUDENT IN AN ORGANIZED HEALTH CARE EDUCATION/TRAINING PROGRAM

## 2020-11-20 RX ORDER — PREGABALIN 50 MG/1
50 CAPSULE ORAL 2 TIMES DAILY
Qty: 60 CAPSULE | Refills: 1 | Status: SHIPPED | OUTPATIENT
Start: 2020-11-20 | End: 2021-05-03

## 2020-11-20 RX ORDER — METHOCARBAMOL 500 MG/1
500 TABLET, FILM COATED ORAL NIGHTLY PRN
Qty: 30 TABLET | Refills: 1 | Status: SHIPPED | OUTPATIENT
Start: 2020-11-20 | End: 2021-01-19

## 2020-12-07 ENCOUNTER — TELEPHONE (OUTPATIENT)
Dept: FAMILY MEDICINE CLINIC | Facility: CLINIC | Age: 50
End: 2020-12-07

## 2020-12-07 DIAGNOSIS — G89.4 CHRONIC PAIN SYNDROME: ICD-10-CM

## 2020-12-07 RX ORDER — TRAMADOL HYDROCHLORIDE 50 MG/1
TABLET ORAL
Qty: 60 TABLET | Refills: 0 | Status: SHIPPED | OUTPATIENT
Start: 2020-12-07 | End: 2021-01-12

## 2020-12-07 NOTE — TELEPHONE ENCOUNTER
Patient called and requested a new order to continue PT treatment at [facility].     Patient would like to schedule a B-12, attempted to warm transfer and was unsuccessful     Please advise     [phone]

## 2020-12-08 ENCOUNTER — CLINICAL SUPPORT (OUTPATIENT)
Dept: FAMILY MEDICINE CLINIC | Facility: CLINIC | Age: 50
End: 2020-12-08

## 2020-12-08 DIAGNOSIS — E53.8 VITAMIN B 12 DEFICIENCY: ICD-10-CM

## 2020-12-08 DIAGNOSIS — M79.7 FIBROMYALGIA: Primary | ICD-10-CM

## 2020-12-08 DIAGNOSIS — E53.8 B12 DEFICIENCY: Primary | ICD-10-CM

## 2020-12-08 PROCEDURE — 96372 THER/PROPH/DIAG INJ SC/IM: CPT | Performed by: FAMILY MEDICINE

## 2020-12-08 RX ORDER — CYANOCOBALAMIN 1000 UG/ML
1000 INJECTION, SOLUTION INTRAMUSCULAR; SUBCUTANEOUS
Status: SHIPPED | OUTPATIENT
Start: 2020-12-08

## 2020-12-08 RX ADMIN — CYANOCOBALAMIN 1000 MCG: 1000 INJECTION, SOLUTION INTRAMUSCULAR; SUBCUTANEOUS at 11:21

## 2020-12-09 DIAGNOSIS — F90.2 ATTENTION DEFICIT HYPERACTIVITY DISORDER (ADHD), COMBINED TYPE: ICD-10-CM

## 2020-12-11 RX ORDER — DEXMETHYLPHENIDATE HYDROCHLORIDE 10 MG/1
TABLET ORAL
Qty: 90 TABLET | Refills: 0 | Status: SHIPPED | OUTPATIENT
Start: 2020-12-11 | End: 2021-01-11 | Stop reason: SDUPTHER

## 2021-01-05 ENCOUNTER — OFFICE VISIT (OUTPATIENT)
Dept: PSYCHIATRY | Facility: CLINIC | Age: 51
End: 2021-01-05

## 2021-01-05 VITALS — DIASTOLIC BLOOD PRESSURE: 90 MMHG | SYSTOLIC BLOOD PRESSURE: 150 MMHG

## 2021-01-05 DIAGNOSIS — F51.05 INSOMNIA DUE TO OTHER MENTAL DISORDER: Chronic | ICD-10-CM

## 2021-01-05 DIAGNOSIS — F90.2 ATTENTION DEFICIT HYPERACTIVITY DISORDER (ADHD), COMBINED TYPE: Chronic | ICD-10-CM

## 2021-01-05 DIAGNOSIS — F99 INSOMNIA DUE TO OTHER MENTAL DISORDER: Chronic | ICD-10-CM

## 2021-01-05 DIAGNOSIS — F33.1 MAJOR DEPRESSIVE DISORDER, RECURRENT EPISODE, MODERATE (HCC): ICD-10-CM

## 2021-01-05 DIAGNOSIS — F43.12 CHRONIC POST-TRAUMATIC STRESS DISORDER (PTSD): Primary | Chronic | ICD-10-CM

## 2021-01-05 PROCEDURE — 99214 OFFICE O/P EST MOD 30 MIN: CPT | Performed by: PHYSICIAN ASSISTANT

## 2021-01-05 RX ORDER — DIAZEPAM 5 MG/1
5 TABLET ORAL NIGHTLY PRN
Qty: 30 TABLET | Refills: 2 | Status: SHIPPED | OUTPATIENT
Start: 2021-01-05 | End: 2021-06-08
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
## 2021-01-05 NOTE — PATIENT INSTRUCTIONS
Managing Post-Traumatic Stress Disorder  If you have been diagnosed with post-traumatic stress disorder (PTSD), you may be relieved that you now know why you have felt or behaved a certain way. Still, you may feel overwhelmed about the treatment ahead. You may also wonder how to get the support you need and how to deal with the condition day-to-day.  If you are living with PTSD, there are ways to help you recover from it and manage your symptoms.  How to manage lifestyle changes  Managing stress  Stress is your body's reaction to life changes and events, both good and bad. Stress can make PTSD worse. Take the following steps to manage stress:  · Talk with your health care provider or a counselor if you would like to learn more about techniques to reduce your stress. He or she may suggest some stress reduction techniques such as:  ? Muscle relaxation exercises.  ? Regular exercise.  ? Meditation, yoga, or other mind-body exercises.  ? Breathing exercises.  ? Listening to quiet music.  ? Spending time outside.  · Maintain a healthy lifestyle. Eat a healthy diet, exercise regularly, get plenty of sleep, and take time to relax.  · Spend time with others. Talk with them about how you are feeling and what kind of support you need. Try not to isolate yourself, even though you may feel like doing that. Isolating yourself can delay your recovery.  · Do activities and hobbies that you enjoy.  · Pace yourself when doing stressful things. Take breaks, and reward yourself when you finish. Make sure that you do not overload your schedule.    Medicines  Your health care provider may suggest certain medicines if he or she feels that they will help to improve your condition. Medicines for depression (antidepressants) or severe loss of contact with reality (antipsychotics) may be used to treat PTSD. Avoid using alcohol and other substances that may prevent your medicines from working properly. It is also important to:  · Talk with  your pharmacist or health care provider about all medicines that you take, their possible side effects, and which medicines are safe to take together.  · Make it your goal to take part in all treatment decisions (shared decision-making). Ask about possible side effects of medicines that your health care provider recommends, and tell him or her how you feel about having those side effects. It is best if shared decision-making with your health care provider is part of your total treatment plan.  If your health care provider prescribes a medicine, you may not notice the full benefits of it for 4-8 weeks. Most people who are treated for PTSD need to take medicine for at least 6-12 months after they feel better. If you are taking medicines as part of your treatment, do not stop taking medicines before you ask your health care provider if it is safe to stop. You may need to have the medicine slowly decreased (tapered) over time to lower the risk of harmful side effects.  Relationships  Many people who have PTSD have difficulty trusting others. Make an effort to:  · Take risks and develop trust with close friends and family members. Developing trust in others can help you feel safe and connect you with emotional support.  · Be open and honest about your feelings.  · Have fun and relax in safe spaces, such as with friends and family.  · Think about going to couples counseling, family education classes, or family therapy. Your loved ones may not always know how to be supportive. Therapy can be helpful for everyone.  How to recognize changes in your condition  Be aware of your symptoms and how often you have them. The following symptoms mean that you need to seek help for your PTSD:  · You feel suspicious and angry.  · You have repeated flashbacks.  · You avoid going out or being with others.  · You have an increasing number of fights with close friends or family members, such as your spouse.  · You have thoughts about  hurting yourself or others.  · You cannot get relief from feelings of depression or anxiety.  Follow these instructions at home:  Lifestyle  · Exercise regularly. Try to do 30 or more minutes of physical activity on most days of the week.  · Try to get 7-9 hours of sleep each night. To help with sleep:  ? Keep your bedroom cool and dark.  ? Avoid screen time before bedtime. This means avoiding use of your TV, computer, tablet, and cell phone.  · Practice self-soothing skills and use them daily.  · Try to have fun and seek humor in your life.  Eating and drinking  · Do not eat a heavy meal during the hour before you go to bed.  · Do not drink alcohol or caffeinated drinks before bed.  · Avoid using alcohol or drugs.  General instructions  · If your PTSD is affecting your marriage or family, seek help from a family therapist.  · Take over-the-counter and prescription medicines only as told by your health care provider.  · Make sure to let all of your health care providers know that you have PTSD. This is especially important if you are having surgery or need to be admitted to the hospital.  · Keep all follow-up visits as told by your health care providers. This is important.  Where to find support  Talking to others  · Explain that PTSD is a mental health problem. It is something that a person can develop after experiencing or seeing a life-threatening event. Tell them that PTSD makes you feel stress like you did during the event.  · Talk to your loved ones about the symptoms you have. Also tell them what things or situations can cause symptoms to start (are triggers for you).  · Assure your loved ones that there are treatments to help PTSD. Discuss possibly seeking family therapy or couples therapy.  · If you are worried or fearful about seeking treatment, ask for support.  · Keep daily contact with at least one trusted friend or family member.  Finances  Not all insurance plans cover mental health care, so it is  important to check with your insurance carrier. If paying for co-pays or counseling services is a problem, search for a local or [facility] mental health care center. Public mental health care services may be offered there at a low cost or no cost when you are not able to see a private health care provider. If you are a , contact a local veterans organization or [facility] for more information.  If you are taking medicine for PTSD, you may be able to get the genericform, which may be less expensive than brand-name medicine. Some makers of prescription medicines also offer help to patients who cannot afford the medicines that they need.  Therapy and support groups  · Find a support group in your community. Often, groups are available for  veterans, trauma victims, and family members or caregivers.  · Look into volunteer opportunities. Taking part in these can help you feel more connected to your community.  · Contact a local organization to find out if you are eligible for a service dog.  Where to find more information  Go to this website to find more information about PTSD, treatment of PTSD, and how to get support:  · National Center for PTSD: www.ptsd.va.gov  Contact a health care provider if:  · Your symptoms get worse or do not get better.  Get help right away if:  · You have thoughts about hurting yourself or others.  If you ever feel like you may hurt yourself or others, or have thoughts about taking your own life, get help right away. You can go to your nearest emergency department or call:  · Your local emergency services (911 in the [place]  · A suicide crisis helpline, such as the National Suicide Prevention Lifeline at 1-647.376.8017. This is open 24-hours a day.  Summary  · If you are living with PTSD, there are ways to help you recover from it and manage your symptoms.  · Find supportive environments and people who understand PTSD. Spend time in those places, and maintain contact with  those people.  · Work with your health care team to create a plan for managing PTSD. The plan should include counseling, stress reduction techniques, and healthy lifestyle habits.  This information is not intended to replace advice given to you by your health care provider. Make sure you discuss any questions you have with your health care provider.  Document Revised: 04/10/2020 Document Reviewed: 04/19/2018  Elsevier Patient Education © 2020 [employer]

## 2021-01-11 RX ORDER — DEXMETHYLPHENIDATE HYDROCHLORIDE 10 MG/1
TABLET ORAL
Qty: 90 TABLET | Refills: 0 | Status: SHIPPED | OUTPATIENT
Start: 2021-01-11 | End: 2021-03-11 | Stop reason: SDUPTHER
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
## 2021-01-12 ENCOUNTER — OFFICE VISIT (OUTPATIENT)
Dept: PAIN MEDICINE | Facility: CLINIC | Age: 51
End: 2021-01-12

## 2021-01-12 VITALS
SYSTOLIC BLOOD PRESSURE: 151 MMHG | BODY MASS INDEX: 23 KG/M2 | OXYGEN SATURATION: 98 % | HEIGHT: 62 IN | DIASTOLIC BLOOD PRESSURE: 104 MMHG | RESPIRATION RATE: 16 BRPM | WEIGHT: 125 LBS | HEART RATE: 111 BPM | TEMPERATURE: 98.4 F

## 2021-01-12 DIAGNOSIS — F43.10 PTSD (POST-TRAUMATIC STRESS DISORDER): ICD-10-CM

## 2021-01-12 DIAGNOSIS — M76.32 IT BAND SYNDROME, LEFT: ICD-10-CM

## 2021-01-12 DIAGNOSIS — R52 GENERALIZED PAIN: ICD-10-CM

## 2021-01-12 DIAGNOSIS — F41.9 ANXIETY: ICD-10-CM

## 2021-01-12 DIAGNOSIS — Z79.891 LONG TERM PRESCRIPTION OPIATE USE: ICD-10-CM

## 2021-01-12 DIAGNOSIS — M79.7 FIBROMYALGIA: Primary | ICD-10-CM

## 2021-01-12 PROCEDURE — 99214 OFFICE O/P EST MOD 30 MIN: CPT | Performed by: STUDENT IN AN ORGANIZED HEALTH CARE EDUCATION/TRAINING PROGRAM

## 2021-01-12 RX ORDER — NALOXONE HYDROCHLORIDE 4 MG/.1ML
1 SPRAY NASAL AS NEEDED
Qty: 1 EACH | Refills: 0 | Status: SHIPPED | OUTPATIENT
Start: 2021-01-12 | End: 2021-02-11

## 2021-01-12 RX ORDER — TRAMADOL HYDROCHLORIDE 50 MG/1
50 TABLET ORAL 2 TIMES DAILY PRN
Qty: 60 TABLET | Refills: 0 | Status: SHIPPED | OUTPATIENT
Start: 2021-01-12 | End: 2021-02-11

## 2021-01-19 ENCOUNTER — TELEPHONE (OUTPATIENT)
Dept: PAIN MEDICINE | Facility: HOSPITAL | Age: 51
End: 2021-01-19

## 2021-02-02 ENCOUNTER — TELEPHONE (OUTPATIENT)
Dept: PAIN MEDICINE | Facility: HOSPITAL | Age: 51
End: 2021-02-02

## 2021-02-02 NOTE — TELEPHONE ENCOUNTER
She is on Robaxin 500mg. There is a note on 01/19 Dr. Mendoza said she can take up to twice a day if needed. He started her on something else last visit that she stopped and restarted the Robaxin she had, I guess that is why it isn't on her med list.

## 2021-02-03 RX ORDER — METHOCARBAMOL 500 MG/1
500 TABLET, FILM COATED ORAL 2 TIMES DAILY PRN
Qty: 60 TABLET | Refills: 0 | Status: SHIPPED | OUTPATIENT
Start: 2021-02-03 | End: 2021-02-25 | Stop reason: SDUPTHER

## 2021-02-24 ENCOUNTER — APPOINTMENT (OUTPATIENT)
Dept: PAIN MEDICINE | Facility: CLINIC | Age: 51
End: 2021-02-24
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
## 2021-02-25 ENCOUNTER — OFFICE VISIT (OUTPATIENT)
Dept: PAIN MEDICINE | Facility: CLINIC | Age: 51
End: 2021-02-25

## 2021-02-25 VITALS
WEIGHT: 130 LBS | HEART RATE: 107 BPM | BODY MASS INDEX: 23.92 KG/M2 | HEIGHT: 62 IN | DIASTOLIC BLOOD PRESSURE: 96 MMHG | OXYGEN SATURATION: 99 % | SYSTOLIC BLOOD PRESSURE: 158 MMHG | RESPIRATION RATE: 16 BRPM | TEMPERATURE: 98 F

## 2021-02-25 DIAGNOSIS — F43.10 PTSD (POST-TRAUMATIC STRESS DISORDER): ICD-10-CM

## 2021-02-25 DIAGNOSIS — Z79.891 LONG TERM PRESCRIPTION OPIATE USE: ICD-10-CM

## 2021-02-25 DIAGNOSIS — R52 GENERALIZED PAIN: ICD-10-CM

## 2021-02-25 DIAGNOSIS — F41.9 ANXIETY: ICD-10-CM

## 2021-02-25 DIAGNOSIS — M79.7 FIBROMYALGIA: Primary | ICD-10-CM

## 2021-02-25 DIAGNOSIS — M76.32 IT BAND SYNDROME, LEFT: ICD-10-CM

## 2021-02-25 PROCEDURE — 99214 OFFICE O/P EST MOD 30 MIN: CPT | Performed by: STUDENT IN AN ORGANIZED HEALTH CARE EDUCATION/TRAINING PROGRAM

## 2021-02-25 RX ORDER — METHOCARBAMOL 500 MG/1
500 TABLET, FILM COATED ORAL 4 TIMES DAILY PRN
Qty: 120 TABLET | Refills: 1 | Status: SHIPPED | OUTPATIENT
Start: 2021-02-25 | End: 2021-04-07

## 2021-02-25 RX ORDER — TRAMADOL HYDROCHLORIDE 50 MG/1
50 TABLET ORAL 2 TIMES DAILY PRN
Qty: 60 TABLET | Refills: 0 | Status: SHIPPED | OUTPATIENT
Start: 2021-02-25 | End: 2021-03-25

## 2021-02-25 NOTE — ACP (ADVANCE CARE PLANNING)
Patient screened positive for depression based on a PHQ-9 score of 15 on 2/25/2021. Follow-up recommendations include: Suicide Risk Assessment performed.  Denies any suicidal ideation.  She is seeing behavioral therapist.    Jerome Mendoza DO  Pain Management   [facility]

## 2021-03-11 DIAGNOSIS — F90.2 ATTENTION DEFICIT HYPERACTIVITY DISORDER (ADHD), COMBINED TYPE: Chronic | ICD-10-CM

## 2021-03-11 RX ORDER — DEXMETHYLPHENIDATE HYDROCHLORIDE 10 MG/1
TABLET ORAL
Qty: 90 TABLET | Refills: 0 | Status: SHIPPED | OUTPATIENT
Start: 2021-03-11 | End: 2021-05-11 | Stop reason: SDUPTHER

## 2021-03-24 ENCOUNTER — APPOINTMENT (OUTPATIENT)
Dept: PAIN MEDICINE | Facility: CLINIC | Age: 51
End: 2021-03-24

## 2021-03-25 DIAGNOSIS — Z79.891 LONG TERM PRESCRIPTION OPIATE USE: ICD-10-CM

## 2021-03-25 DIAGNOSIS — M76.32 IT BAND SYNDROME, LEFT: ICD-10-CM

## 2021-03-25 DIAGNOSIS — M79.7 FIBROMYALGIA: ICD-10-CM

## 2021-03-25 DIAGNOSIS — F43.10 PTSD (POST-TRAUMATIC STRESS DISORDER): ICD-10-CM

## 2021-03-25 DIAGNOSIS — R52 GENERALIZED PAIN: ICD-10-CM

## 2021-03-25 DIAGNOSIS — F41.9 ANXIETY: ICD-10-CM

## 2021-03-25 RX ORDER — TRAMADOL HYDROCHLORIDE 50 MG/1
50 TABLET ORAL 2 TIMES DAILY PRN
Qty: 28 TABLET | Refills: 0 | Status: SHIPPED | OUTPATIENT
Start: 2021-03-25 | End: 2021-04-07 | Stop reason: SDUPTHER

## 2021-03-25 NOTE — TELEPHONE ENCOUNTER
Tramadol prescription for 50 mg twice daily as needed for 14 days sent to the pharmacy on 3/25/2021.  Inspect and UDS reviewed and consistent.    Jerome Mendoza DO  Pain Management   [facility]
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
## 2021-04-05 ENCOUNTER — APPOINTMENT (OUTPATIENT)
Dept: PAIN MEDICINE | Facility: CLINIC | Age: 51
End: 2021-04-05

## 2021-04-07 ENCOUNTER — OFFICE VISIT (OUTPATIENT)
Dept: PAIN MEDICINE | Facility: CLINIC | Age: 51
End: 2021-04-07

## 2021-04-07 ENCOUNTER — HOSPITAL ENCOUNTER (OUTPATIENT)
Dept: GENERAL RADIOLOGY | Facility: HOSPITAL | Age: 51
Discharge: HOME | End: 2021-04-07

## 2021-04-07 VITALS
WEIGHT: 130 LBS | SYSTOLIC BLOOD PRESSURE: 149 MMHG | RESPIRATION RATE: 16 BRPM | BODY MASS INDEX: 23.92 KG/M2 | OXYGEN SATURATION: 99 % | DIASTOLIC BLOOD PRESSURE: 87 MMHG | HEIGHT: 62 IN | HEART RATE: 108 BPM | TEMPERATURE: 98.9 F

## 2021-04-07 DIAGNOSIS — Z79.891 LONG TERM PRESCRIPTION OPIATE USE: ICD-10-CM

## 2021-04-07 DIAGNOSIS — M76.32 IT BAND SYNDROME, LEFT: ICD-10-CM

## 2021-04-07 DIAGNOSIS — M47.816 LUMBAR SPONDYLOSIS: ICD-10-CM

## 2021-04-07 DIAGNOSIS — M47.816 LUMBAR SPONDYLOSIS: Primary | ICD-10-CM

## 2021-04-07 DIAGNOSIS — R52 GENERALIZED PAIN: ICD-10-CM

## 2021-04-07 DIAGNOSIS — F41.9 ANXIETY: ICD-10-CM

## 2021-04-07 DIAGNOSIS — M79.7 FIBROMYALGIA: ICD-10-CM

## 2021-04-07 DIAGNOSIS — F43.10 PTSD (POST-TRAUMATIC STRESS DISORDER): ICD-10-CM

## 2021-04-07 PROCEDURE — 72100 X-RAY EXAM L-S SPINE 2/3 VWS: CPT

## 2021-04-07 PROCEDURE — 99214 OFFICE O/P EST MOD 30 MIN: CPT | Performed by: STUDENT IN AN ORGANIZED HEALTH CARE EDUCATION/TRAINING PROGRAM

## 2021-04-07 RX ORDER — TRAMADOL HYDROCHLORIDE 50 MG/1
50 TABLET ORAL 2 TIMES DAILY PRN
Qty: 60 TABLET | Refills: 0 | Status: SHIPPED | OUTPATIENT
Start: 2021-04-17 | End: 2021-05-17

## 2021-04-07 RX ORDER — METHOCARBAMOL 750 MG/1
750 TABLET, FILM COATED ORAL 4 TIMES DAILY PRN
Qty: 120 TABLET | Refills: 1 | Status: SHIPPED | OUTPATIENT
Start: 2021-04-07 | End: 2021-05-07
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
## 2021-04-08 ENCOUNTER — TELEPHONE (OUTPATIENT)
Dept: PAIN MEDICINE | Facility: CLINIC | Age: 51
End: 2021-04-08

## 2021-04-08 NOTE — TELEPHONE ENCOUNTER
Caller: PATIENT     Relationship to patient: SELF       Best call back number: [phone]       Type of visit: MEDICAL BRANCH BLOCK    Requested date: 04/29/21- 11:30-WANTS THIS TIME IF AVAILABLE.     If rescheduling, when is the original appointment: 04/29/21 T 10:30A.M.     Additional notes:PT. ASKING IF SHE CAN CHANGE TIME TO 11:30 FOR HER PROCEDURE ON 04/29/21  PLEASE CALL TO ADVISE.

## 2021-04-29 ENCOUNTER — HOSPITAL ENCOUNTER (OUTPATIENT)
Dept: PAIN MEDICINE | Facility: HOSPITAL | Age: 51
Discharge: HOME | End: 2021-04-29

## 2021-04-29 VITALS
WEIGHT: 130 LBS | DIASTOLIC BLOOD PRESSURE: 87 MMHG | TEMPERATURE: 98.2 F | HEIGHT: 62 IN | RESPIRATION RATE: 16 BRPM | BODY MASS INDEX: 23.92 KG/M2 | HEART RATE: 81 BPM | SYSTOLIC BLOOD PRESSURE: 138 MMHG | OXYGEN SATURATION: 100 %

## 2021-04-29 DIAGNOSIS — M47.816 LUMBAR SPONDYLOSIS: ICD-10-CM

## 2021-04-29 DIAGNOSIS — R52 PAIN: ICD-10-CM

## 2021-04-29 PROCEDURE — 64493 INJ PARAVERT F JNT L/S 1 LEV: CPT | Performed by: STUDENT IN AN ORGANIZED HEALTH CARE EDUCATION/TRAINING PROGRAM

## 2021-04-29 PROCEDURE — 77003 FLUOROGUIDE FOR SPINE INJECT: CPT

## 2021-04-29 PROCEDURE — 64494 INJ PARAVERT F JNT L/S 2 LEV: CPT | Performed by: STUDENT IN AN ORGANIZED HEALTH CARE EDUCATION/TRAINING PROGRAM

## 2021-04-29 PROCEDURE — 25010000002 METHYLPREDNISOLONE PER 80 MG

## 2021-04-29 RX ORDER — LIDOCAINE HYDROCHLORIDE 10 MG/ML
5 INJECTION, SOLUTION EPIDURAL; INFILTRATION; INTRACAUDAL; PERINEURAL ONCE
Status: COMPLETED | OUTPATIENT
Start: 2021-04-29 | End: 2021-04-29

## 2021-04-29 RX ORDER — METHYLPREDNISOLONE ACETATE 80 MG/ML
INJECTION, SUSPENSION INTRA-ARTICULAR; INTRALESIONAL; INTRAMUSCULAR; SOFT TISSUE
Status: DISCONTINUED
Start: 2021-04-29 | End: 2021-04-30 | Stop reason: HOSPADM

## 2021-04-29 RX ORDER — METHYLPREDNISOLONE ACETATE 80 MG/ML
80 INJECTION, SUSPENSION INTRA-ARTICULAR; INTRALESIONAL; INTRAMUSCULAR; SOFT TISSUE ONCE
Status: COMPLETED | OUTPATIENT
Start: 2021-04-29 | End: 2021-04-29

## 2021-04-29 RX ORDER — BUPIVACAINE HYDROCHLORIDE 2.5 MG/ML
INJECTION, SOLUTION EPIDURAL; INFILTRATION; INTRACAUDAL
Status: DISCONTINUED
Start: 2021-04-29 | End: 2021-04-30 | Stop reason: HOSPADM

## 2021-04-29 RX ORDER — LIDOCAINE HYDROCHLORIDE 10 MG/ML
INJECTION, SOLUTION EPIDURAL; INFILTRATION; INTRACAUDAL; PERINEURAL
Status: DISCONTINUED
Start: 2021-04-29 | End: 2021-04-30 | Stop reason: HOSPADM

## 2021-04-29 RX ORDER — BUPIVACAINE HYDROCHLORIDE 2.5 MG/ML
10 INJECTION, SOLUTION EPIDURAL; INFILTRATION; INTRACAUDAL ONCE
Status: COMPLETED | OUTPATIENT
Start: 2021-04-29 | End: 2021-04-29

## 2021-04-29 RX ADMIN — BUPIVACAINE HYDROCHLORIDE 10 ML: 2.5 INJECTION, SOLUTION EPIDURAL; INFILTRATION; INTRACAUDAL at 11:55

## 2021-04-29 RX ADMIN — METHYLPREDNISOLONE ACETATE 80 MG: 80 INJECTION, SUSPENSION INTRA-ARTICULAR; INTRALESIONAL; INTRAMUSCULAR; SOFT TISSUE at 11:55

## 2021-04-29 RX ADMIN — LIDOCAINE HYDROCHLORIDE 5 ML: 10 INJECTION, SOLUTION EPIDURAL; INFILTRATION; INTRACAUDAL; PERINEURAL at 11:51

## 2021-04-29 NOTE — ADDENDUM NOTE
Encounter addended by: Jerome Mendoza DO on: 4/29/2021 12:55 PM   Actions taken: Diagnosis association updated, Order list changed

## 2021-04-29 NOTE — DISCHARGE INSTRUCTIONS
Medial Branch Nerve Block    Medial branch nerve block is a procedure to numb the nerves that supply the joints between your spinal bones (facet joints). The facet joints are located on the back of your spine. You may have the procedure on your neck or your upper, middle, or lower spine.  During this procedure, your health care provider will inject a numbing medicine (local anesthetic) around the medial nerves near the facet joint being treated. If more than one facet joint is causing pain, you may have more than one injection. In most cases, an anti-inflammatory medicine (steroid) will also be injected. You may need this procedure if:  · You have back pain from wear and tear (osteoarthritis) of your facet joint.  · You have an injury to a facet joint.  · Your health care provider wants to diagnose a facet joint as the cause of your pain. If the procedure relieves the pain, this indicates that the facet joint was the cause.  The local anesthetic will relieve pain for several days. The steroid may continue to relieve pain for several weeks. If your pain returns when the medicines wear off, this procedure may be repeated.  Tell a health care provider about:  · Any allergies you have.  · All medicines you are taking, including vitamins, herbs, eye drops, creams, and over-the-counter medicines.  · Any problems you or family members have had with anesthetic medicines.  · Any blood disorders you have.  · Any surgeries you have had.  · Any medical conditions you have.  · Whether you are pregnant or may be pregnant.  What are the risks?  Generally, this is a safe procedure. However, problems may occur, including:  · Infection.  · Bleeding.  · Allergic reactions to medicines or dyes.  · Damage to other structures or organs.  · Injection of the anesthetic into a blood vessel, which may decrease blood supply to your spinal cord and cause damage.  · Spread of the anesthetic to nearby nerves, which may cause temporary weakness  or numbness.  What happens before the procedure?  · Ask your health care provider about:  ? Changing or stopping your regular medicines. This is especially important if you are taking diabetes medicines or blood thinners.  ? Taking medicines such as aspirin and ibuprofen. These medicines can thin your blood. Do not take these medicines unless your health care provider tells you to take them.  ? Taking over-the-counter medicines, vitamins, herbs, and supplements.  · Plan to have someone take you home from the hospital or clinic.  · Follow instructions from your health care provider about any eating or drinking restrictions before the procedure.  · Ask your health care provider what steps will be taken to help prevent infection. These may include:  ? Removing hair at the injection site.  ? Washing skin with a germ-killing soap.  What happens during the procedure?  · An IV may be inserted into one of your veins.  · You will be given one or more of the following:  ? A medicine to help you relax (sedative).  ? A medicine to numb the area (local anesthetic). Your health care provider will feel for the facet joint or joints that are causing pain and inject a short-acting local anesthetic into the skin over the joint or joints.  · Your health care provider will then pass a needle into the area around the facet joint.  · Your health care provider may use a type of X-ray (fluoroscopy) to look at images of your spinal cord. If so, the health care provider will inject a small amount of dye into the facet joint area. The dye will show up on fluoroscopy and help locate the exact area to inject the long-acting anesthetic.  · The medicine will then be injected. Along with the long-acting anesthetic, a steroid medicine may also be injected.  · The needle will be removed, and a bandage will be placed over the injection site.  The procedure may vary among health care providers and hospitals.  What can I expect after the  procedure?  · Your blood pressure, heart rate, breathing rate, and blood oxygen level will be monitored until you leave the hospital or clinic.  · You should feel less pain in your back.  · You may have some soreness around the injection site.  Follow these instructions at home:  Injection site care  · Leave your bandage on for 24 hours.  · Do not take baths, swim, or use a hot tub until your health care provider approves.  · Check your injection site every day for signs of infection. Check for:  ? Redness, swelling, or pain.  ? Fluid or blood.  ? Warmth.  ? Pus or a bad smell.  · If directed, put ice on the injection area:  ? Put ice in a plastic bag.  ? Place a towel between your skin and the bag.  ? Leave the ice on for 20 minutes, 2-3 times a day.  General instructions  · Take over-the-counter and prescription medicines only as told by your health care provider.  · Do not drive for 24 hours if you were given a sedative during the procedure.  · Return to your normal activities as told by your health care provider. Ask your health care provider what activities are safe for you.  · Keep a log of your pain after the procedure. Keep track of how much pain you have and when you have it. This will help your health care provider plan your future treatment.  ? You should have relief of pain from the anesthetic for up to 3 days.  ? After that you may notice some pain again until the steroid starts to help. Pain relief from the steroid may last for a few weeks.  · Keep all follow-up visits as told by your health care provider. This is important.  Contact your health care provider if:  · Your pain is not relieved or gets worse at home.  · You have a fever or chills.  · You have any signs of infection.  · You develop any numbness or weakness.  Summary  · Medial branch nerve block is a procedure to numb the nerves that supply the joints between your spinal bones (facet joint).  · You may have the procedure on your neck or  your upper, middle, or lower spine.  · This procedure may be done both to diagnose and relieve facet joint pain.  · A long-acting local anesthetic is injected close to the nerve that supplies the facet joint. An anti-inflammatory medicine (steroid) will also be injected.  This information is not intended to replace advice given to you by your health care provider. Make sure you discuss any questions you have with your health care provider.  Document Revised: 04/10/2020 Document Reviewed: 08/22/2019  Elsevier Patient Education © 2021 [employer]

## 2021-04-29 NOTE — H&P
H and P reviewed from previous visit and no changes to patient's clinical presentation. Will proceed with procedure as planned. Patient denies history of DM and being on blood thinners.    Lumbar x-ray had shown facet arthropathy lower lumbar spine.  Patient has facet tenderness positive bilateral L4-SA bilateral and facet loading positive as well.    Jerome Mendoza DO  Pain Management   [facility]

## 2021-04-29 NOTE — ADDENDUM NOTE
Encounter addended by: Pham Gómez RN on: 4/29/2021 1:05 PM   Actions taken: MAR administration accepted

## 2021-04-29 NOTE — PROCEDURES
PROCEDURE:  Bilateral L 4, 5 and SA MBB     INDICATION: Patient complains of pain in the lower back, bilateral.  Pain increases on extension of the spine, facet tenderness present.       PROCEDURE DETAILS:   After obtaining written informed consent patient was taken to the procedure room. Pre-procedure blood pressure and pulse were stable and recorded in patients clinic chart. The patient was placed in a prone position and the lower back was prepped with chloraprep and draped in the usual sterile fashion.  The skin was infiltrated with 1% lidocaine at the junction of transverse process with the vertebral body at the left L 4 level. A 22-gauge, 3.5-inch needle was inserted into the lumbar medial branch under radiographic guidance. Both AP and lateral views were obtained.   Following placement of the needle and negative aspiration, 1.2 cc mixture of bupivacaine 0.25% with depo-medrol was injected  The identical procedure was performed on left L5 and SA medial branch was repeated on right side at L4, L5, Sa.  A total of 9 cc of 0.25% bupivacaine with 80 mg of Depo-medrol was injected. During the procedure there was no evidence of CSF, paresthesias or heme.    After the procedure the needles were removed. Skin was cleaned and a sterile dressing was applied.    Following the procedure the patient's vital signs were stable. The patient was discharged home in good condition after being given discharge instructions.    COMPLICATIONS None    Jerome Mendoza DO  Pain Management   [facility]

## 2021-05-03 ENCOUNTER — OFFICE VISIT (OUTPATIENT)
Dept: FAMILY MEDICINE CLINIC | Facility: CLINIC | Age: 51
End: 2021-05-03

## 2021-05-03 VITALS
OXYGEN SATURATION: 98 % | TEMPERATURE: 96.9 F | DIASTOLIC BLOOD PRESSURE: 86 MMHG | BODY MASS INDEX: 24.37 KG/M2 | SYSTOLIC BLOOD PRESSURE: 156 MMHG | HEIGHT: 62 IN | HEART RATE: 132 BPM | WEIGHT: 132.4 LBS

## 2021-05-03 DIAGNOSIS — M79.7 FIBROMYALGIA: ICD-10-CM

## 2021-05-03 DIAGNOSIS — E03.9 ACQUIRED HYPOTHYROIDISM: Primary | ICD-10-CM

## 2021-05-03 DIAGNOSIS — F90.9 ATTENTION DEFICIT HYPERACTIVITY DISORDER (ADHD), UNSPECIFIED ADHD TYPE: ICD-10-CM

## 2021-05-03 DIAGNOSIS — F43.10 PTSD (POST-TRAUMATIC STRESS DISORDER): ICD-10-CM

## 2021-05-03 PROBLEM — L65.9 HAIR LOSS: Status: RESOLVED | Noted: 2017-07-24 | Resolved: 2021-05-03

## 2021-05-03 PROBLEM — F43.12 CHRONIC POST-TRAUMATIC STRESS DISORDER (PTSD): Status: RESOLVED | Noted: 2020-03-05 | Resolved: 2021-05-03

## 2021-05-03 PROBLEM — R07.9 CHEST PAIN: Status: RESOLVED | Noted: 2019-05-14 | Resolved: 2021-05-03

## 2021-05-03 PROCEDURE — 99213 OFFICE O/P EST LOW 20 MIN: CPT | Performed by: NURSE PRACTITIONER

## 2021-05-03 RX ORDER — MELATONIN
1000 DAILY
COMMUNITY
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
## 2021-05-10 RX ORDER — PROGESTERONE 100 MG/1
CAPSULE ORAL
Qty: 15 CAPSULE | Refills: 1 | Status: SHIPPED | OUTPATIENT
Start: 2021-05-10 | End: 2021-07-06

## 2021-05-11 DIAGNOSIS — F90.2 ATTENTION DEFICIT HYPERACTIVITY DISORDER (ADHD), COMBINED TYPE: Chronic | ICD-10-CM

## 2021-05-11 RX ORDER — DEXMETHYLPHENIDATE HYDROCHLORIDE 10 MG/1
TABLET ORAL
Qty: 90 TABLET | Refills: 0 | Status: SHIPPED | OUTPATIENT
Start: 2021-05-11 | End: 2021-07-20 | Stop reason: SDUPTHER

## 2021-05-19 ENCOUNTER — OFFICE VISIT (OUTPATIENT)
Dept: PAIN MEDICINE | Facility: CLINIC | Age: 51
End: 2021-05-19

## 2021-05-19 VITALS
SYSTOLIC BLOOD PRESSURE: 130 MMHG | HEART RATE: 104 BPM | WEIGHT: 130 LBS | HEIGHT: 62 IN | DIASTOLIC BLOOD PRESSURE: 84 MMHG | BODY MASS INDEX: 23.92 KG/M2 | RESPIRATION RATE: 16 BRPM | OXYGEN SATURATION: 100 %

## 2021-05-19 DIAGNOSIS — M79.7 FIBROMYALGIA: ICD-10-CM

## 2021-05-19 DIAGNOSIS — M47.816 LUMBAR SPONDYLOSIS: Primary | ICD-10-CM

## 2021-05-19 DIAGNOSIS — M76.32 IT BAND SYNDROME, LEFT: ICD-10-CM

## 2021-05-19 DIAGNOSIS — Z79.891 LONG TERM PRESCRIPTION OPIATE USE: ICD-10-CM

## 2021-05-19 PROCEDURE — 99214 OFFICE O/P EST MOD 30 MIN: CPT | Performed by: STUDENT IN AN ORGANIZED HEALTH CARE EDUCATION/TRAINING PROGRAM

## 2021-05-19 RX ORDER — METHOCARBAMOL 750 MG/1
750 TABLET, FILM COATED ORAL 4 TIMES DAILY PRN
Qty: 120 TABLET | Refills: 1 | Status: SHIPPED | OUTPATIENT
Start: 2021-05-19 | End: 2021-06-28 | Stop reason: SDUPTHER

## 2021-05-19 RX ORDER — TRAMADOL HYDROCHLORIDE 50 MG/1
50 TABLET ORAL 2 TIMES DAILY PRN
Qty: 60 TABLET | Refills: 0 | Status: SHIPPED | OUTPATIENT
Start: 2021-06-01 | End: 2021-06-28 | Stop reason: SDUPTHER

## 2021-05-19 RX ORDER — TRAMADOL HYDROCHLORIDE 50 MG/1
50 TABLET ORAL EVERY 6 HOURS PRN
COMMUNITY
End: 2021-05-19 | Stop reason: SDUPTHER
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
## 2021-06-08 DIAGNOSIS — F43.12 CHRONIC POST-TRAUMATIC STRESS DISORDER (PTSD): Chronic | ICD-10-CM

## 2021-06-08 RX ORDER — DIAZEPAM 5 MG/1
TABLET ORAL
Qty: 30 TABLET | Refills: 1 | Status: SHIPPED | OUTPATIENT
Start: 2021-06-08
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
## 2021-06-28 ENCOUNTER — OFFICE VISIT (OUTPATIENT)
Dept: PAIN MEDICINE | Facility: CLINIC | Age: 51
End: 2021-06-28

## 2021-06-28 VITALS
HEIGHT: 62 IN | SYSTOLIC BLOOD PRESSURE: 144 MMHG | WEIGHT: 130 LBS | HEART RATE: 95 BPM | OXYGEN SATURATION: 100 % | RESPIRATION RATE: 16 BRPM | DIASTOLIC BLOOD PRESSURE: 99 MMHG | BODY MASS INDEX: 23.92 KG/M2

## 2021-06-28 DIAGNOSIS — M47.816 LUMBAR SPONDYLOSIS: ICD-10-CM

## 2021-06-28 DIAGNOSIS — M79.7 FIBROMYALGIA: ICD-10-CM

## 2021-06-28 DIAGNOSIS — M76.32 IT BAND SYNDROME, LEFT: ICD-10-CM

## 2021-06-28 PROCEDURE — 99214 OFFICE O/P EST MOD 30 MIN: CPT | Performed by: STUDENT IN AN ORGANIZED HEALTH CARE EDUCATION/TRAINING PROGRAM

## 2021-06-28 RX ORDER — METHOCARBAMOL 750 MG/1
750 TABLET, FILM COATED ORAL 4 TIMES DAILY PRN
Qty: 120 TABLET | Refills: 1 | Status: SHIPPED | OUTPATIENT
Start: 2021-06-28 | End: 2021-08-27

## 2021-06-28 RX ORDER — TRAMADOL HYDROCHLORIDE 50 MG/1
50 TABLET ORAL 2 TIMES DAILY PRN
Qty: 60 TABLET | Refills: 1 | Status: SHIPPED | OUTPATIENT
Start: 2021-07-05 | End: 2021-09-03

## 2021-07-05 RX ORDER — LEVOTHYROXINE, LIOTHYRONINE 57; 13.5 UG/1; UG/1
TABLET ORAL
Qty: 30 TABLET | Refills: 5 | Status: SHIPPED | OUTPATIENT
Start: 2021-07-05

## 2021-07-06 RX ORDER — PROGESTERONE 100 MG/1
CAPSULE ORAL
Qty: 15 CAPSULE | Refills: 0 | Status: SHIPPED | OUTPATIENT
Start: 2021-07-06 | End: 2021-08-02

## 2021-07-20 ENCOUNTER — OFFICE VISIT (OUTPATIENT)
Dept: PSYCHIATRY | Facility: CLINIC | Age: 51
End: 2021-07-20

## 2021-07-20 VITALS — SYSTOLIC BLOOD PRESSURE: 128 MMHG | DIASTOLIC BLOOD PRESSURE: 78 MMHG

## 2021-07-20 DIAGNOSIS — F90.0 ATTENTION DEFICIT HYPERACTIVITY DISORDER (ADHD), PREDOMINANTLY INATTENTIVE TYPE: Chronic | ICD-10-CM

## 2021-07-20 DIAGNOSIS — F41.9 ANXIETY: Primary | Chronic | ICD-10-CM

## 2021-07-20 DIAGNOSIS — F43.10 PTSD (POST-TRAUMATIC STRESS DISORDER): Chronic | ICD-10-CM

## 2021-07-20 DIAGNOSIS — F90.2 ATTENTION DEFICIT HYPERACTIVITY DISORDER (ADHD), COMBINED TYPE: Chronic | ICD-10-CM

## 2021-07-20 PROCEDURE — 99214 OFFICE O/P EST MOD 30 MIN: CPT | Performed by: PHYSICIAN ASSISTANT

## 2021-07-20 RX ORDER — DEXMETHYLPHENIDATE HYDROCHLORIDE 10 MG/1
TABLET ORAL
Qty: 90 TABLET | Refills: 0 | Status: SHIPPED | OUTPATIENT
Start: 2021-07-20

## 2021-07-20 NOTE — PROGRESS NOTES
"Subjective   Nessaevan Esqueda is a 50 y.o. white female who presents today for follow up in the office      Chief Complaint:  Depression, insomnia, ADHD     History of Present Illness:   She is thinking about putting the three kids at [facility]   Taking a pure holistic approach with the kids (no longer going to vaccinate etc)  She is talking to [facility] for assistance, phone visits for now, once weekly plus sees her regular counselor weekly  Her oldest daughter with ASD (autistic sprectrum d/o) and hearing loss has started high school at  and worried  Two of her daughters are older and have their own with kids  Still no support from ,   She is also seeing a therapist at [facility] who is helpful  Anxiety 5/10   Chronic insomnia, has been sleeping about 5 hrs on average per night taking the Vistaril  Depression 5/10, cannot tolerate most meds or won't try for fear of \"brain altering\"  Doing fine since switch to Focalin, no need for changes, more attentive   Denies Si/HI  Got cortisone shots in her back which helped a lot for a month, supposed to go back to see Dr. Mendoza for more     The following portions of the patient's history were reviewed and updated as appropriate: allergies, current medications, past family history, past medical history, past social history, past surgical history and problem list.    PAST PSYCHIATRIC HISTORY  Axis I  Affective/Bipoloar Disorder, Anxiety/Panic Disorder, Attention Deficit Disorder  Axis II  None    PAST OUTPATIENT TREATMENT  Diagnosis treated:  Affective Disorder, Anxiety/Panic Disorder, ADD  Treatment Type:  Medication Management, Supportive Counseling  Prior Psychiatric Medications:  Halcion did not help a lot and makes her restless in bed.  Doxepin did not help  Trazodone made her hair fall out.   Lunesta quit working.  Quetiapine   elavil did not help  Ambien was too \"hypnotic\"  Lunesta   Duloxetine was filled but " never took it b/c daughter had Genesight testing done and had gene to drug reaction  Gabapentin  Focalin  Ritalin  Vistaril   CBD oil, not helpful  Valium   Support Groups:  None  Sequelae Of Mental Disorder:  social isolation, family disruption, emotional distress      Interval History  No Change    Side Effects  None    Past psychiatric history was reviewed and compared to 21 visit and appropriate updates were made.    Past Medical History:  Past Medical History:   Diagnosis Date   • ADHD (attention deficit hyperactivity disorder)    • Anxiety    • Cyst of knee joint, left    • Disease of thyroid gland    • Fibromyalgia, primary    • Low back pain    • PTSD (post-traumatic stress disorder)    • Torn meniscus lef       Social History:  Social History     Socioeconomic History   • Marital status:      Spouse name: Not on file   • Number of children: Not on file   • Years of education: Not on file   • Highest education level: Not on file   Tobacco Use   • Smoking status: Never Smoker   • Smokeless tobacco: Never Used   Vaping Use   • Vaping Use: Never used   Substance and Sexual Activity   • Alcohol use: Yes     Comment: socially   • Drug use: No   • Sexual activity: Defer       Family History:  Family History   Problem Relation Age of Onset   • Hypertension Mother    • Heart disease Mother    • Endometrial cancer Mother    • Thyroid disease Father    • Prostate cancer Father    • Cancer Father         bile duct   • Breast cancer Sister    • Hypertension Maternal Grandmother    • Throat cancer Maternal Grandfather    • No Known Problems Paternal Grandmother    • No Known Problems Paternal Grandfather        Past Surgical History:  Past Surgical History:   Procedure Laterality Date   • BREAST AUGMENTATION     • BUNIONECTOMY Bilateral    •  SECTION      x's4   • SUBTOTAL HYSTERECTOMY      still has ovaries       Problem List:  Patient Active Problem List   Diagnosis   • Hormone imbalance   •  Hypothyroidism   • Insomnia   • Low testosterone   • Magnesium deficiency   • Palpitations   • Pelvic pain   • Vitamin D deficiency   • Anxiety   • Attention deficit hyperactivity disorder (ADHD), predominantly inattentive type   • PTSD (post-traumatic stress disorder)   • Cyst of knee joint, left   • Torn meniscus   • Fibromyalgia       Allergy:   Allergies   Allergen Reactions   • Lortab [Hydrocodone-Acetaminophen] Shortness Of Breath        Discontinued Medications:  Medications Discontinued During This Encounter   Medication Reason   • dexmethylphenidate (FOCALIN) 10 MG tablet Reorder       Current Medications:   Current Outpatient Medications   Medication Sig Dispense Refill   • B Complex Vitamins (VITAMIN B COMPLEX PO) Take  by mouth.     • cholecalciferol (VITAMIN D3) 25 MCG (1000 UT) tablet Take 1,000 Units by mouth Daily.     • dexmethylphenidate (FOCALIN) 10 MG tablet Take two in the AM and one in the afternoon 90 tablet 0   • diazePAM (VALIUM) 5 MG tablet TAKE ONE TABLET BY MOUTH EVERY NIGHT AT BEDTIME AS NEEDED FOR ANXIETY 30 tablet 1   • MAGNESIUM PO MAGNESIUM CAPS     • Melatonin-Pyridoxine 1-10 MG tablet Take 20 mg by mouth Every Night.     • methocarbamol (ROBAXIN) 750 MG tablet Take 1 tablet by mouth 4 (Four) Times a Day As Needed for Muscle Spasms for up to 60 days. 120 tablet 1   • Milnacipran HCl 12.5 & 25 & 50 MG misc Day-1 - 12.5 mg Day 2 - 3 - 12.5 mg twice daily Day 4-7 - 25 mg twice daily Day 8 and onwards - 50 mg twice daily 55 each 0   • NP Thyroid 90 MG tablet TAKE ONE TABLET BY MOUTH DAILY 30 tablet 5   • Progesterone (PROMETRIUM) 100 MG capsule TAKE ONE CAPSULE BY MOUTH EVERY OTHER DAY 15 capsule 0   • Specialty Vitamins Products (BIOTIN PLUS KERATIN PO) Take  by mouth.     • traMADol (ULTRAM) 50 MG tablet Take 1 tablet by mouth 2 (Two) Times a Day As Needed for Moderate Pain  for up to 60 days. 60 tablet 1     Current Facility-Administered Medications   Medication Dose Route Frequency  Provider Last Rate Last Admin   • cyanocobalamin injection 1,000 mcg  1,000 mcg Intramuscular Q28 Days Richie Christian MD   1,000 mcg at 12/08/20 1121         Review of Symptoms:    Psychiatric/Behavioral: Negative for agitation, behavioral problems, confusion, decreased concentration, dysphoric mood, hallucinations, self-injury, sleep disturbance and suicidal ideas. The patient is mildly nervous/anxious and is not hyperactive.        Physical Exam:   Blood pressure 128/78.    Mental Status Exam:   Hygiene:  Good  Cooperation:  Cooperative  Eye Contact:  Good  Psychomotor Behavior:  Appropriate  Affect:  Appropriate  Mood: anxious  Hopelessness: Denies  Speech:  Normal  Thought Process:  Goal directed  Thought Content:  Normal  Suicidal:  None  Homicidal:  None  Hallucinations:  None  Delusion:  None  Memory:  Intact  Orientation:  Person, Place, Time and Situation  Reliability:  good  Insight:  Good  Judgement:  Good  Impulse Control:  Good  Physical/Medical Issues:  No      Mental status exam was reviewed and compared to his 1/5/21 visit and appropriate updates were made.    PHQ-9 Depression Screening  Little interest or pleasure in doing things? 1   Feeling down, depressed, or hopeless? 2   Trouble falling or staying asleep, or sleeping too much? 3   Feeling tired or having little energy? 1   Poor appetite or overeating? 1   Feeling bad about yourself - or that you are a failure or have let yourself or your family down? 2   Trouble concentrating on things, such as reading the newspaper or watching television? 1   Moving or speaking so slowly that other people could have noticed? Or the opposite - being so fidgety or restless that you have been moving around a lot more than usual? 0   Thoughts that you would be better off dead, or of hurting yourself in some way? 0   PHQ-9 Total Score 11   If you checked off any problems, how difficult have these problems made it for you to do your work, take care of things  at home, or get along with other people? Very difficult           Never smoker    I advised Nessa of the risks of tobacco use.     Lab Results:   No visits with results within 3 Month(s) from this visit.   Latest known visit with results is:   Office Visit on 08/25/2020   Component Date Value Ref Range Status   • C-Reactive Protein 08/25/2020 0.15  0.00 - 0.50 mg/dL Final   • Sed Rate 08/25/2020 4  0 - 20 mm/hr Final   • Glucose 08/25/2020 93  65 - 99 mg/dL Final   • BUN 08/25/2020 15  6 - 20 mg/dL Final   • Creatinine 08/25/2020 0.83  0.57 - 1.00 mg/dL Final   • Sodium 08/25/2020 137  136 - 145 mmol/L Final   • Potassium 08/25/2020 4.1  3.5 - 5.2 mmol/L Final   • Chloride 08/25/2020 99  98 - 107 mmol/L Final   • CO2 08/25/2020 26.3  22.0 - 29.0 mmol/L Final   • Calcium 08/25/2020 9.8  8.6 - 10.5 mg/dL Final   • Total Protein 08/25/2020 7.8  6.0 - 8.5 g/dL Final   • Albumin 08/25/2020 4.80  3.50 - 5.20 g/dL Final   • ALT (SGPT) 08/25/2020 19  1 - 33 U/L Final   • AST (SGOT) 08/25/2020 25  1 - 32 U/L Final   • Alkaline Phosphatase 08/25/2020 59  39 - 117 U/L Final   • Total Bilirubin 08/25/2020 0.6  0.0 - 1.2 mg/dL Final   • eGFR Non African Amer 08/25/2020 73  >60 mL/min/1.73 Final   • Globulin 08/25/2020 3.0  gm/dL Final   • A/G Ratio 08/25/2020 1.6  g/dL Final   • BUN/Creatinine Ratio 08/25/2020 18.1  7.0 - 25.0 Final   • Anion Gap 08/25/2020 11.7  5.0 - 15.0 mmol/L Final   • Antinuclear Antibodies (ERIN) 08/25/2020 Negative  Negative Final   • Rheumatoid Factor Quantitative 08/25/2020 10.2  0.0 - 14.0 IU/mL Final   • CCP Antibodies IgG/IgA 08/25/2020 6  0 - 19 units Final                              Negative               <20                            Weak positive      20 - 39                            Moderate positive  40 - 59                            Strong positive        >59   • WBC 08/25/2020 5.75  3.40 - 10.80 10*3/mm3 Final   • RBC 08/25/2020 4.85  3.77 - 5.28 10*6/mm3 Final   • Hemoglobin  08/25/2020 14.4  12.0 - 15.9 g/dL Final   • Hematocrit 08/25/2020 42.3  34.0 - 46.6 % Final   • MCV 08/25/2020 87.2  79.0 - 97.0 fL Final   • MCH 08/25/2020 29.7  26.6 - 33.0 pg Final   • MCHC 08/25/2020 34.0  31.5 - 35.7 g/dL Final   • RDW 08/25/2020 13.6  12.3 - 15.4 % Final   • RDW-SD 08/25/2020 43.2  37.0 - 54.0 fl Final   • MPV 08/25/2020 10.6  6.0 - 12.0 fL Final   • Platelets 08/25/2020 255  140 - 450 10*3/mm3 Final   • Neutrophil % 08/25/2020 63.1  42.7 - 76.0 % Final   • Lymphocyte % 08/25/2020 23.5  19.6 - 45.3 % Final   • Monocyte % 08/25/2020 10.6  5.0 - 12.0 % Final   • Eosinophil % 08/25/2020 1.6  0.3 - 6.2 % Final   • Basophil % 08/25/2020 0.9  0.0 - 1.5 % Final   • Immature Grans % 08/25/2020 0.3  0.0 - 0.5 % Final   • Neutrophils, Absolute 08/25/2020 3.63  1.70 - 7.00 10*3/mm3 Final   • Lymphocytes, Absolute 08/25/2020 1.35  0.70 - 3.10 10*3/mm3 Final   • Monocytes, Absolute 08/25/2020 0.61  0.10 - 0.90 10*3/mm3 Final   • Eosinophils, Absolute 08/25/2020 0.09  0.00 - 0.40 10*3/mm3 Final   • Basophils, Absolute 08/25/2020 0.05  0.00 - 0.20 10*3/mm3 Final   • Immature Grans, Absolute 08/25/2020 0.02  0.00 - 0.05 10*3/mm3 Final   • nRBC 08/25/2020 0.0  0.0 - 0.2 /100 WBC Final       Assessment/Plan   Problems Addressed this Visit        Mental Health    Anxiety - Primary (Chronic)    Attention deficit hyperactivity disorder (ADHD), predominantly inattentive type (Chronic)    Relevant Medications    dexmethylphenidate (FOCALIN) 10 MG tablet    PTSD (post-traumatic stress disorder) (Chronic)    Relevant Medications    dexmethylphenidate (FOCALIN) 10 MG tablet      Other Visit Diagnoses     Attention deficit hyperactivity disorder (ADHD), combined type  (Chronic)       Relevant Medications    dexmethylphenidate (FOCALIN) 10 MG tablet      Diagnoses       Codes Comments    Anxiety    -  Primary ICD-10-CM: F41.9  ICD-9-CM: 300.00     PTSD (post-traumatic stress disorder)     ICD-10-CM: F43.10  ICD-9-CM:  309.81     Attention deficit hyperactivity disorder (ADHD), predominantly inattentive type     ICD-10-CM: F90.0  ICD-9-CM: 314.00     Attention deficit hyperactivity disorder (ADHD), combined type     ICD-10-CM: F90.2  ICD-9-CM: 314.01           Visit Diagnoses:    ICD-10-CM ICD-9-CM   1. Anxiety  F41.9 300.00   2. PTSD (post-traumatic stress disorder)  F43.10 309.81   3. Attention deficit hyperactivity disorder (ADHD), predominantly inattentive type  F90.0 314.00   4. Attention deficit hyperactivity disorder (ADHD), combined type  F90.2 314.01       TREATMENT PLAN/GOALS: Continue supportive psychotherapy efforts and medications as indicated. Treatment and medication options discussed during today's visit. Patient ackowledged and verbally consented to continue with current treatment plan and was educated on the importance of compliance with treatment and follow-up appointments.    MEDICATION ISSUES:  INSPECT reviewed as expected  Discussed medication options and treatment plan of prescribed medication as well as the risks, benefits, and side effects including potential falls, possible impaired driving and metabolic adversities among others. Patient is agreeable to call the office with any worsening of symptoms or onset of side effects. Patient is agreeable to call 911 or go to the nearest ER should he/she begin having SI/HI. No medication side effects or related complaints today.     Patient has, as ususal, a lot of stressors in her life.  Continue Focalin 10 mg 2 tablets in the morning and 1 tablet in the afternoon as needed for ADD   Continue Valium 5mg daily as needed for anxiety and sleep, last filled 7/11/21 per Inspect  She is no longer taking the Vistaril, was not helpful     MEDS ORDERED DURING VISIT:  New Medications Ordered This Visit   Medications   • dexmethylphenidate (FOCALIN) 10 MG tablet     Sig: Take two in the AM and one in the afternoon     Dispense:  90 tablet     Refill:  0       Return in about  3 months (around 10/20/2021).        This document has been electronically signed by Raina Jordan PA-C  July 20, 2021 11:31 EDT

## 2021-07-20 NOTE — PATIENT INSTRUCTIONS
Persistent Depressive Disorder, Adult  Persistent depressive disorder (PDD) is a mental health condition that causes symptoms of low-level depression for 2 years or longer. This disorder may also be called long-term (chronic) depression or dysthymia. PDD may include episodes of major depressive disorder (MDD), which is more severe depression that lasts for about 2 weeks.  PDD can affect the way you think, feel, and behave. This condition may also affect your relationships. You may be more likely to get sick if you have PDD.  What are the causes?  The exact cause of this condition is not known. PDD is most likely caused by a combination of things, which may include:  · Your personality traits.  · Your learned or conditioned behaviors, thoughts or feelings that reinforce negativity.  · Substance abuse or misuse.  · How you are able to manage chronic medical or mental health illness you may have.  · Going through a traumatic experience or major life changes.  What increases the risk?  The following factors may also make someone more likely to develop PDD:  · A family history of depression.  · Being a woman.  · Abnormally low levels of certain brain chemicals.  · Traumatic or painful events in childhood, especially abuse or the loss of a parent.  · Chronic stress caused by upsetting life experiences, troubled family relationships, or losses.  · Chronic physical illness or other mental health disorders.  · Cultural stress, such as stress from poor living conditions or discrimination.  What are the signs or symptoms?  Symptoms of this condition may occur for most of the day, and may include:  · Physical symptoms. These may include:  ? Tiredness or low energy.  ? Eating or sleeping too much or too little.  ? Being restless or agitated.  ? Unexplained physical complaints.  · Mental and emotional symptoms. These may be:  ? Feeling hopeless, worthless, or guilty.  ? Anxiety.  ? Trouble focusing or making decisions.  ? Low  self-esteem.  ? Negative outlook.  ? Feeling irritable.  ? Being unable to have fun or feel pleasure.  · Behavioral symptoms. These may include:  ? Withdrawing from others.  ? Aggressive behavior or anger.  How is this diagnosed?  This condition may be diagnosed based on:  · Your symptoms.  · Your medical history, including your mental health history. This may involve tests to evaluate your level of depression. You may be asked questions about your lifestyle, including any drug and alcohol use, and how long you have had symptoms of PDD.  · A physical exam.  · Blood tests to rule out other conditions.  PDD may be diagnosed if you have had the following symptoms:  · A depressed mood or loss of interest in things for 2 years or longer.  · Other symptoms of depression.  How is this treated?  This condition is usually treated by mental health professionals, such as psychologists, psychiatrists, and clinical social workers. You may need more than one type of treatment. Treatment may include:  · Psychotherapy, also called talk therapy or counseling. Types of psychotherapy include:  ? Cognitive behavioral therapy (CBT). This teaches you to recognize unhealthy feelings, thoughts, and behaviors, and to replace them with positive thoughts and actions.  ? Interpersonal therapy (IPT). This helps you to improve the way you relate to and communicate with others.  ? Family therapy. This treatment involves members of your family.  · Medicines to treat anxiety and depression, or to help balance chemicals in your brain that affect emotions and behaviors.  · Lifestyle changes, such as:  ? Limiting alcohol and drug use.  ? Getting regular exercise.  ? Developing a consistent sleep routine.  ? Making healthy eating choices.  ? Spending more time outdoors.  A combination of psychotherapy and medicines is thought to be the most effective form of treatment.  Follow these instructions at home:  Activity    · Exercise regularly and spend time  outdoors as told.  · Find activities that you enjoy doing, and make time to do them.  · Find healthy ways to manage stress, such as:  ? Meditation or deep breathing.  ? Spending time in nature.  ? Journaling.  · Return to your normal activities as told by your health care provider.  Alcohol and drug use  · If you drink alcohol:  ? Limit how much you use to:  § 0-1 drink a day for nonpregnant women.  § 0-2 drinks a day for men.  ? Be aware of how much alcohol is in your drink. In the [place] one drink equals one 12 oz bottle of beer (355 mL), one 5 oz glass of wine (148 mL), or one 1½ oz glass of hard liquor (44 mL).  ? Discuss your alcohol use with your health care provider. Alcohol can affect any antidepressant medicines you are taking.  · Discuss any drug use with your health care provider.  General instructions    · Take over-the-counter and prescription medicines and herbal preparations only as told by your health care provider.  · Eat a healthy diet and get plenty of sleep.  · Consider joining a support group. Your health care provider may be able to recommend one.  · Keep all follow-up visits as told by your health care provider. This is important.  Where to find more information  · National [place] on Mental Illness: www.donna.org  · [place] National [place] of Mental Health: www.nimh.nih.gov  · American Psychiatric Association: www.psychiatry.org/patients-families  Contact a health care provider if:  · Your symptoms get worse.  · You develop new symptoms.  · You have trouble sleeping or doing your daily activities.  Get help right away if:  · You harm yourself.  · You have serious thoughts about hurting yourself or others.  · You see, hear, taste, smell, or feel things that are not real (hallucinate).  If you ever feel like you may hurt yourself or others, or have thoughts about taking your own life, get help right away. Go to your nearest emergency department or:  · Call your local emergency services (656 in  the [place]  · Call a suicide crisis helpline, such as the National Suicide Prevention Lifeline at 1-103.821.8450. This is open 24 hours a day in the [place]  · Text the Crisis Text Line at 439505 (in the [place]  Summary  · Persistent depressive disorder (PDD) is a mental health condition that causes symptoms of low-level depression for 2 years or longer.  · This condition is usually treated by mental health professionals. You may need more than one type of treatment. Treatment may involve psychotherapy, medicines, and lifestyle changes.  · Get help right away if you have serious thoughts about hurting yourself or others.  This information is not intended to replace advice given to you by your health care provider. Make sure you discuss any questions you have with your health care provider.  Document Revised: 11/28/2020 Document Reviewed: 11/28/2020  [employer]vier Patient Education © 2021 [employer]

## 2021-08-02 RX ORDER — PROGESTERONE 100 MG/1
CAPSULE ORAL
Qty: 15 CAPSULE | Refills: 0 | Status: SHIPPED | OUTPATIENT
Start: 2021-08-02 | End: 2021-09-01

## 2021-09-01 RX ORDER — PROGESTERONE 100 MG/1
CAPSULE ORAL
Qty: 15 CAPSULE | Refills: 3 | Status: SHIPPED | OUTPATIENT
Start: 2021-09-01 | End: 2022-06-14

## 2021-09-09 DIAGNOSIS — M76.32 IT BAND SYNDROME, LEFT: ICD-10-CM

## 2021-09-09 DIAGNOSIS — M47.816 LUMBAR SPONDYLOSIS: ICD-10-CM

## 2021-09-09 DIAGNOSIS — M79.7 FIBROMYALGIA: ICD-10-CM

## 2021-09-09 RX ORDER — TRAMADOL HYDROCHLORIDE 50 MG/1
TABLET ORAL
Qty: 60 TABLET | Refills: 0 | OUTPATIENT
Start: 2021-09-09 | End: 2021-10-09

## 2021-09-15 ENCOUNTER — OFFICE VISIT (OUTPATIENT)
Dept: PAIN MEDICINE | Facility: CLINIC | Age: 51
End: 2021-09-15

## 2021-09-15 VITALS
HEIGHT: 62 IN | RESPIRATION RATE: 16 BRPM | OXYGEN SATURATION: 99 % | HEART RATE: 111 BPM | BODY MASS INDEX: 23.92 KG/M2 | WEIGHT: 130 LBS | DIASTOLIC BLOOD PRESSURE: 83 MMHG | SYSTOLIC BLOOD PRESSURE: 122 MMHG

## 2021-09-15 DIAGNOSIS — M47.816 LUMBAR SPONDYLOSIS: Primary | ICD-10-CM

## 2021-09-15 DIAGNOSIS — M79.7 FIBROMYALGIA: ICD-10-CM

## 2021-09-15 DIAGNOSIS — M76.32 IT BAND SYNDROME, LEFT: ICD-10-CM

## 2021-09-15 DIAGNOSIS — F43.10 PTSD (POST-TRAUMATIC STRESS DISORDER): ICD-10-CM

## 2021-09-15 DIAGNOSIS — Z79.891 LONG TERM PRESCRIPTION OPIATE USE: ICD-10-CM

## 2021-09-15 DIAGNOSIS — F41.9 ANXIETY: ICD-10-CM

## 2021-09-15 PROCEDURE — 99214 OFFICE O/P EST MOD 30 MIN: CPT | Performed by: STUDENT IN AN ORGANIZED HEALTH CARE EDUCATION/TRAINING PROGRAM

## 2021-09-15 RX ORDER — TRAMADOL HYDROCHLORIDE 50 MG/1
50 TABLET ORAL EVERY 6 HOURS PRN
COMMUNITY
End: 2021-09-15 | Stop reason: SDUPTHER

## 2021-09-15 RX ORDER — METHOCARBAMOL 750 MG/1
750 TABLET, FILM COATED ORAL 4 TIMES DAILY PRN
Qty: 120 TABLET | Refills: 0 | Status: SHIPPED | OUTPATIENT
Start: 2021-09-15 | End: 2021-11-14

## 2021-09-15 RX ORDER — METHOCARBAMOL 750 MG/1
750 TABLET, FILM COATED ORAL 4 TIMES DAILY PRN
COMMUNITY
End: 2021-09-15 | Stop reason: SDUPTHER

## 2021-09-15 RX ORDER — TRAMADOL HYDROCHLORIDE 50 MG/1
50 TABLET ORAL 2 TIMES DAILY PRN
Qty: 60 TABLET | Refills: 0 | Status: SHIPPED | OUTPATIENT
Start: 2021-09-15 | End: 2021-10-15

## 2022-05-04 NOTE — PROGRESS NOTES
Spoke with pt. She is not taking Vit D (or will not take as often). Has an appt with Dr. Santamaria and will discuss levels with her at that time. n/a

## 2022-06-14 RX ORDER — PROGESTERONE 100 MG/1
CAPSULE ORAL
Qty: 15 CAPSULE | Refills: 3 | Status: SHIPPED | OUTPATIENT
Start: 2022-06-14